# Patient Record
Sex: MALE | Race: BLACK OR AFRICAN AMERICAN | NOT HISPANIC OR LATINO | Employment: STUDENT | URBAN - METROPOLITAN AREA
[De-identification: names, ages, dates, MRNs, and addresses within clinical notes are randomized per-mention and may not be internally consistent; named-entity substitution may affect disease eponyms.]

---

## 2019-05-28 ENCOUNTER — HOSPITAL ENCOUNTER (EMERGENCY)
Facility: HOSPITAL | Age: 24
Discharge: HOME | End: 2019-05-28
Attending: EMERGENCY MEDICINE
Payer: MEDICAID

## 2019-05-28 VITALS
RESPIRATION RATE: 18 BRPM | HEART RATE: 90 BPM | SYSTOLIC BLOOD PRESSURE: 151 MMHG | DIASTOLIC BLOOD PRESSURE: 65 MMHG | OXYGEN SATURATION: 97 % | HEIGHT: 72 IN | BODY MASS INDEX: 41.99 KG/M2 | TEMPERATURE: 98 F | WEIGHT: 310 LBS

## 2019-05-28 DIAGNOSIS — R05.9 COUGH: ICD-10-CM

## 2019-05-28 DIAGNOSIS — J45.901 MILD ASTHMA WITH EXACERBATION, UNSPECIFIED WHETHER PERSISTENT: Primary | ICD-10-CM

## 2019-05-28 DIAGNOSIS — R06.02 SOB (SHORTNESS OF BREATH): ICD-10-CM

## 2019-05-28 LAB
ALBUMIN SERPL BCP-MCNC: 4.2 G/DL (ref 3.5–5.2)
ALP SERPL-CCNC: 86 U/L (ref 55–135)
ALT SERPL W/O P-5'-P-CCNC: 109 U/L (ref 10–44)
ANION GAP SERPL CALC-SCNC: 8 MMOL/L (ref 8–16)
AST SERPL-CCNC: 94 U/L (ref 10–40)
BASOPHILS # BLD AUTO: 0.01 K/UL (ref 0–0.2)
BASOPHILS NFR BLD: 0.2 % (ref 0–1.9)
BILIRUB SERPL-MCNC: 0.8 MG/DL (ref 0.1–1)
BUN SERPL-MCNC: 11 MG/DL (ref 6–20)
CALCIUM SERPL-MCNC: 9.9 MG/DL (ref 8.7–10.5)
CHLORIDE SERPL-SCNC: 105 MMOL/L (ref 95–110)
CO2 SERPL-SCNC: 25 MMOL/L (ref 23–29)
CREAT SERPL-MCNC: 0.9 MG/DL (ref 0.5–1.4)
DIFFERENTIAL METHOD: NORMAL
EOSINOPHIL # BLD AUTO: 0.1 K/UL (ref 0–0.5)
EOSINOPHIL NFR BLD: 1.1 % (ref 0–8)
ERYTHROCYTE [DISTWIDTH] IN BLOOD BY AUTOMATED COUNT: 12.8 % (ref 11.5–14.5)
EST. GFR  (AFRICAN AMERICAN): >60 ML/MIN/1.73 M^2
EST. GFR  (NON AFRICAN AMERICAN): >60 ML/MIN/1.73 M^2
GLUCOSE SERPL-MCNC: 83 MG/DL (ref 70–110)
HCT VFR BLD AUTO: 46.9 % (ref 40–54)
HGB BLD-MCNC: 15.1 G/DL (ref 14–18)
LIPASE SERPL-CCNC: 19 U/L (ref 4–60)
LYMPHOCYTES # BLD AUTO: 1.7 K/UL (ref 1–4.8)
LYMPHOCYTES NFR BLD: 25.5 % (ref 18–48)
MCH RBC QN AUTO: 27.2 PG (ref 27–31)
MCHC RBC AUTO-ENTMCNC: 32.2 G/DL (ref 32–36)
MCV RBC AUTO: 84 FL (ref 82–98)
MONOCYTES # BLD AUTO: 0.6 K/UL (ref 0.3–1)
MONOCYTES NFR BLD: 8.3 % (ref 4–15)
NEUTROPHILS # BLD AUTO: 4.3 K/UL (ref 1.8–7.7)
NEUTROPHILS NFR BLD: 64.9 % (ref 38–73)
PLATELET # BLD AUTO: 298 K/UL (ref 150–350)
PMV BLD AUTO: 10.5 FL (ref 9.2–12.9)
POTASSIUM SERPL-SCNC: 3.7 MMOL/L (ref 3.5–5.1)
PROT SERPL-MCNC: 7.5 G/DL (ref 6–8.4)
RBC # BLD AUTO: 5.56 M/UL (ref 4.6–6.2)
SODIUM SERPL-SCNC: 138 MMOL/L (ref 136–145)
TROPONIN I SERPL DL<=0.01 NG/ML-MCNC: <0.006 NG/ML (ref 0–0.03)
WBC # BLD AUTO: 6.66 K/UL (ref 3.9–12.7)

## 2019-05-28 PROCEDURE — 83690 ASSAY OF LIPASE: CPT

## 2019-05-28 PROCEDURE — 84484 ASSAY OF TROPONIN QUANT: CPT

## 2019-05-28 PROCEDURE — 96374 THER/PROPH/DIAG INJ IV PUSH: CPT

## 2019-05-28 PROCEDURE — 93005 ELECTROCARDIOGRAM TRACING: CPT

## 2019-05-28 PROCEDURE — 85025 COMPLETE CBC W/AUTO DIFF WBC: CPT

## 2019-05-28 PROCEDURE — 94640 AIRWAY INHALATION TREATMENT: CPT

## 2019-05-28 PROCEDURE — 25000242 PHARM REV CODE 250 ALT 637 W/ HCPCS: Performed by: PHYSICIAN ASSISTANT

## 2019-05-28 PROCEDURE — 93010 EKG 12-LEAD: ICD-10-PCS | Mod: ,,, | Performed by: INTERNAL MEDICINE

## 2019-05-28 PROCEDURE — 80053 COMPREHEN METABOLIC PANEL: CPT

## 2019-05-28 PROCEDURE — 99285 EMERGENCY DEPT VISIT HI MDM: CPT | Mod: 25

## 2019-05-28 PROCEDURE — 93010 ELECTROCARDIOGRAM REPORT: CPT | Mod: ,,, | Performed by: INTERNAL MEDICINE

## 2019-05-28 PROCEDURE — 63600175 PHARM REV CODE 636 W HCPCS: Performed by: PHYSICIAN ASSISTANT

## 2019-05-28 PROCEDURE — 25000003 PHARM REV CODE 250: Performed by: PHYSICIAN ASSISTANT

## 2019-05-28 RX ORDER — IPRATROPIUM BROMIDE AND ALBUTEROL SULFATE 2.5; .5 MG/3ML; MG/3ML
3 SOLUTION RESPIRATORY (INHALATION)
Status: COMPLETED | OUTPATIENT
Start: 2019-05-28 | End: 2019-05-28

## 2019-05-28 RX ORDER — ALBUTEROL SULFATE 90 UG/1
1-2 AEROSOL, METERED RESPIRATORY (INHALATION) EVERY 6 HOURS PRN
Qty: 1 INHALER | Refills: 0 | Status: SHIPPED | OUTPATIENT
Start: 2019-05-28 | End: 2020-05-27

## 2019-05-28 RX ORDER — METHYLPREDNISOLONE SOD SUCC 125 MG
125 VIAL (EA) INJECTION
Status: COMPLETED | OUTPATIENT
Start: 2019-05-28 | End: 2019-05-28

## 2019-05-28 RX ORDER — PREDNISONE 20 MG/1
40 TABLET ORAL DAILY
Qty: 10 TABLET | Refills: 0 | Status: SHIPPED | OUTPATIENT
Start: 2019-05-28 | End: 2019-06-02

## 2019-05-28 RX ADMIN — IPRATROPIUM BROMIDE AND ALBUTEROL SULFATE 3 ML: .5; 3 SOLUTION RESPIRATORY (INHALATION) at 01:05

## 2019-05-28 RX ADMIN — METHYLPREDNISOLONE SODIUM SUCCINATE 125 MG: 125 INJECTION, POWDER, FOR SOLUTION INTRAMUSCULAR; INTRAVENOUS at 01:05

## 2019-05-28 RX ADMIN — LIDOCAINE HYDROCHLORIDE: 20 SOLUTION ORAL; TOPICAL at 03:05

## 2019-05-28 NOTE — ED NOTES
Pt reports increased stress at home and states he has anxiety.  Reports he has financial stressors in addition to stressors from school.

## 2019-05-28 NOTE — ED TRIAGE NOTES
Pt reports sob since yesterday with chest pain ongoing for a year worse today.  Reports history of asthma.  Pt reports cough x1 week.  PT mouth breathing and speaking with whisper.  Stidor in throat audible heard with inhalations.

## 2019-05-28 NOTE — ED PROVIDER NOTES
"Encounter Date: 5/28/2019    SCRIBE #1 NOTE: I, Roshni Emmanuel, am scribing for, and in the presence of,  Tr Gomes. I have scribed the entire note.       History     Chief Complaint   Patient presents with    Shortness of Breath     Pt reports 2 day hx of wheezing. Pt has had cough for several days. Pt with audible wheeze/stridor at triage.      CC: Shortness of breath    HPI:  This is a 23 y.o. male with a PMHx of asthma, and PSHx of tonsillectomy who presents to the Emergency Department with a cc of shortness of breath since yesterday. Associated symptoms include wheezing, mild neck swelling, and mild trouble swallowing since yesterday, and cough x 1 week. Additionally, he reports intermittent chest pain over that past year. He denies fever or chills. He reports no worsening or alleviating factors. He states this is different from his prior asthma exacerbations because, "the chest pain is causing my symptoms." NKDA, but reports allergy to shellfish.        The history is provided by the patient.     Review of patient's allergies indicates:   Allergen Reactions    Shellfish containing products Hives     Past Medical History:   Diagnosis Date    Appendicitis     Asthma      Past Surgical History:   Procedure Laterality Date    APPENDECTOMY      DRAINAGE N/A 5/12/2015    Performed by [facility] Surgeon at [facility]    Teja placement Bilateral     for bowing    TONSILLECTOMY       No family history on file.  Social History     Tobacco Use    Smoking status: Never Smoker    Smokeless tobacco: Never Used   Substance Use Topics    Alcohol use: No    Drug use: Not on file     Review of Systems   Constitutional: Negative for chills and fever.   HENT: Positive for trouble swallowing. Negative for sore throat.    Respiratory: Positive for cough, shortness of breath and wheezing.    Cardiovascular: Positive for chest pain.   Gastrointestinal: Negative for nausea.   Genitourinary: Negative for dysuria. "   Musculoskeletal: Negative for back pain.        Positive for neck swelling.   Skin: Negative for rash.   Neurological: Negative for weakness.   Hematological: Does not bruise/bleed easily.       Physical Exam     Initial Vitals [05/28/19 1316]   BP Pulse Resp Temp SpO2   126/68 83 20 98 °F (36.7 °C) 97 %      MAP       --         Physical Exam    Nursing note and vitals reviewed.  Constitutional: He appears well-developed and well-nourished. No distress.   HENT:   Head: Normocephalic and atraumatic.   Nose: Nose normal.   Mouth/Throat: Oropharynx is clear and moist.   Eyes: EOM are normal. Pupils are equal, round, and reactive to light.   Neck: Normal range of motion. Neck supple.   Cardiovascular: Normal rate, regular rhythm and normal heart sounds. Exam reveals no gallop and no friction rub.    No murmur heard.  Pulmonary/Chest: Effort normal and breath sounds normal. No accessory muscle usage. No tachypnea. No respiratory distress. He has no wheezes. He has no rhonchi. He has no rales.   Patient appears to exhale forcibly to create a stridorous noise.  Unable to actively auscultate the lung fields secondary to upper airway noise transmission.  Patient does not appear tachypneic or have increased work of breathing.  Patient is able to speak in full clear sentences.   Abdominal: Soft. Bowel sounds are normal. He exhibits no mass. There is no tenderness. There is no rebound and no guarding.   Musculoskeletal: Normal range of motion.   Neurological: He is alert and oriented to person, place, and time. He has normal strength. No cranial nerve deficit or sensory deficit.   Skin: Skin is warm and dry. Capillary refill takes less than 2 seconds.   Psychiatric: He has a normal mood and affect.         ED Course   Procedures  Labs Reviewed   COMPREHENSIVE METABOLIC PANEL - Abnormal; Notable for the following components:       Result Value    AST 94 (*)      (*)     All other components within normal limits    TROPONIN I   CBC W/ AUTO DIFFERENTIAL   LIPASE          Imaging Results          X-Ray Chest 1 View (Final result)  Result time 05/28/19 15:13:58   Procedure changed from X-Ray Chest PA And Lateral     Final result by Kwame Stephens MD (05/28/19 15:13:58)                 Impression:      No acute cardiopulmonary process.      Electronically signed by: Kwame Stephens MD  Date:    05/28/2019  Time:    15:13             Narrative:    EXAMINATION:  XR CHEST 1 VIEW    CLINICAL HISTORY:  sob; Cough    TECHNIQUE:  Single frontal view of the chest was performed.    COMPARISON:  Chest 01/06/2018    FINDINGS:  Heart size within normal limits.  Mediastinum is unremarkable.    When compared to the previous study patient is slightly oblique greater to the right.  Slight prominence of the hilar markings on the left, likely vascular in nature.  Lungs are otherwise clear.  Pulmonary vasculature within normal limits.  There is no pleural effusions.                                 Medical Decision Making:   Differential Diagnosis:   Differential diagnosis includes but is not limited to:  Asthma exacerbation, pneumonia, pneumothorax, ACS, PE, angioedema, CHF, cardiac arrhythmia    Clinical Tests:   Lab Tests: Ordered and Reviewed  Radiological Study: Ordered and Reviewed  ED Management:  This is an evaluation of a 23 y.o. male who presents to the ED for shortness of breath.  Vital signs are stable.   Patient is not tachypneic and without evidence of hypoxia with room air oxygen 97%.  Patient is nontoxic appearing and in no acute distress.  There was difficulty auscultating the lung secondary to upper airway noise transmission.  Patient appears to forcibly inhaling creating a stridorous noise.  Patient does not appear tachypneic.  No accessory muscle usage or other signs of respiratory distress. Posterior oropharynx clear.  Uvula is midline. Patient is able to handle his secretions.  Patient treated the ED with duo nebs and  methylprednisolone.  Chest x-ray shows no acute cardiopulmonary processes.  EKG shows normal sinus rhythm with a rate of 78 with no ST segment changes.  Upon re-evaluation, patient was breathing comfortably on room air without stridorous noises.  As soon as I made my presence known to the patient, the patient began forcefully inhaling creating the same noise he did on initial presentation.  Given complaints of chest pain, labs are obtained. CBC showed no leukocytosis.  H&H stable. CMP shows no significant electrolyte abnormalities.  Lipase within normal limits. Troponin negative. I doubt cardiac etiology of patient's symptoms at this time.  Given history of asthma, I suspect the etiology patient's symptoms likely secondary to asthma exacerbation.  Will discharge patient home with albuterol inhaler and prednisone.  Will encourage patient follow up primary care.    Patient given return precautions and instructed to return to the emergency department for any new or worsening symptoms. Patient verbalized understanding and agreed with plan.     I discussed the case with Dr. Royal who is in agreement with my assessment and plan.                        Clinical Impression:     1. Mild asthma with exacerbation, unspecified whether persistent    2. Cough    3. SOB (shortness of breath)            Disposition:   Disposition: Discharged  Condition: Stable    Scribe attestation: I, Tr Gomes , personally performed the services described in this documentation. All medical record entries made by the scribe were at my direction and in my presence.  I have reviewed the chart and agree that the record reflects my personal performance and is accurate and complete.                      Tr Gomes PA-C  05/28/19 [id number]

## 2019-05-29 ENCOUNTER — HOSPITAL ENCOUNTER (EMERGENCY)
Facility: HOSPITAL | Age: 24
Discharge: HOME | End: 2019-05-29
Attending: EMERGENCY MEDICINE
Payer: MEDICAID

## 2019-05-29 VITALS
DIASTOLIC BLOOD PRESSURE: 63 MMHG | TEMPERATURE: 98 F | WEIGHT: 310 LBS | SYSTOLIC BLOOD PRESSURE: 133 MMHG | OXYGEN SATURATION: 99 % | HEART RATE: 112 BPM | HEIGHT: 72 IN | BODY MASS INDEX: 41.99 KG/M2 | RESPIRATION RATE: 19 BRPM

## 2019-05-29 DIAGNOSIS — F41.9 ANXIETY: Primary | ICD-10-CM

## 2019-05-29 DIAGNOSIS — R05.9 COUGH: ICD-10-CM

## 2019-05-29 DIAGNOSIS — R06.1 STRIDOR: ICD-10-CM

## 2019-05-29 DIAGNOSIS — J45.909 ASTHMA: ICD-10-CM

## 2019-05-29 PROCEDURE — 25000242 PHARM REV CODE 250 ALT 637 W/ HCPCS: Performed by: PHYSICIAN ASSISTANT

## 2019-05-29 PROCEDURE — 99285 EMERGENCY DEPT VISIT HI MDM: CPT | Mod: 25

## 2019-05-29 PROCEDURE — 94640 AIRWAY INHALATION TREATMENT: CPT

## 2019-05-29 PROCEDURE — 27000221 HC OXYGEN, UP TO 24 HOURS

## 2019-05-29 PROCEDURE — 99285 PR EMERGENCY DEPT VISIT,LEVEL V: ICD-10-PCS | Mod: ,,, | Performed by: EMERGENCY MEDICINE

## 2019-05-29 PROCEDURE — 94761 N-INVAS EAR/PLS OXIMETRY MLT: CPT

## 2019-05-29 PROCEDURE — 25000003 PHARM REV CODE 250: Performed by: PHYSICIAN ASSISTANT

## 2019-05-29 PROCEDURE — 99285 EMERGENCY DEPT VISIT HI MDM: CPT | Mod: ,,, | Performed by: EMERGENCY MEDICINE

## 2019-05-29 RX ORDER — LORAZEPAM 0.5 MG/1
0.5 TABLET ORAL
Status: COMPLETED | OUTPATIENT
Start: 2019-05-29 | End: 2019-05-29

## 2019-05-29 RX ORDER — GUAIFENESIN 600 MG/1
600 TABLET, EXTENDED RELEASE ORAL
Status: COMPLETED | OUTPATIENT
Start: 2019-05-29 | End: 2019-05-29

## 2019-05-29 RX ORDER — IPRATROPIUM BROMIDE AND ALBUTEROL SULFATE 2.5; .5 MG/3ML; MG/3ML
3 SOLUTION RESPIRATORY (INHALATION)
Status: COMPLETED | OUTPATIENT
Start: 2019-05-29 | End: 2019-05-29

## 2019-05-29 RX ADMIN — IPRATROPIUM BROMIDE AND ALBUTEROL SULFATE 3 ML: .5; 3 SOLUTION RESPIRATORY (INHALATION) at 03:05

## 2019-05-29 RX ADMIN — IPRATROPIUM BROMIDE AND ALBUTEROL SULFATE 3 ML: .5; 3 SOLUTION RESPIRATORY (INHALATION) at 02:05

## 2019-05-29 RX ADMIN — LORAZEPAM 0.5 MG: 0.5 TABLET ORAL at 03:05

## 2019-05-29 RX ADMIN — GUAIFENESIN 600 MG: 600 TABLET, EXTENDED RELEASE ORAL at 03:05

## 2019-05-29 NOTE — ED PROVIDER NOTES
Encounter Date: 5/29/2019       History     Chief Complaint   Patient presents with    Asthma     seen at  yest told asthma, pump not working     The patient is a 22 yo male with a PMHx significant for asthma and tonsillectomy. He presents to the ER c/o of an asthma attack. He reports having coughing, wheezes, throat tightness, and SOB intermittently x 1 week. He denies any fever or chills. He denies any mitigating or exacerbating factors. He denies any possibility of FB, aspiration, or choking. He went to an ER for this yesterday and had a negative work up and was improved after being treated with Prednisone and Albuterol, but the symptoms returned this afternoon per his family.          Review of patient's allergies indicates:   Allergen Reactions    Shellfish containing products Hives     Past Medical History:   Diagnosis Date    Appendicitis     Asthma      Past Surgical History:   Procedure Laterality Date    APPENDECTOMY      DRAINAGE N/A 5/12/2015    Performed by Latonya Surgeon at [facility]    Teja placement Bilateral     for bowing    TONSILLECTOMY       No family history on file.  Social History     Tobacco Use    Smoking status: Never Smoker    Smokeless tobacco: Never Used   Substance Use Topics    Alcohol use: No    Drug use: Not on file     Review of Systems   Constitutional: Negative for chills and fever.   HENT: Negative for dental problem, drooling, facial swelling, sore throat, trouble swallowing and voice change.    Eyes: Negative for visual disturbance.   Respiratory: Positive for cough, shortness of breath, wheezing and stridor.    Cardiovascular: Negative for chest pain, palpitations and leg swelling.   Gastrointestinal: Negative for abdominal pain, diarrhea, nausea and vomiting.   Genitourinary: Negative for decreased urine volume and hematuria.   Musculoskeletal: Negative for gait problem, neck pain and neck stiffness.   Skin: Negative for color change and rash.   Neurological:  Negative for dizziness, seizures, syncope, speech difficulty, weakness, light-headedness, numbness and headaches.   Psychiatric/Behavioral: Negative for confusion. The patient is nervous/anxious.        Physical Exam     Initial Vitals [05/29/19 1422]   BP Pulse Resp Temp SpO2   112/80 103 18 98.3 °F (36.8 °C) 99 %      MAP       --         Physical Exam    Nursing note and vitals reviewed.  Constitutional: He appears well-developed and well-nourished. He is not diaphoretic.   Alert and ambulatory. Accompanied by family.    HENT:   Head: Normocephalic.   Mouth/Throat: Oropharynx is clear and moist. No oropharyngeal exudate.   Patent airway. No drooling or trismus. No palatal petechia. No FB. No oropharyngeal abscess, edema, or exudate. No induration to oral floor.    Eyes: Conjunctivae are normal.   Neck: Normal range of motion. Neck supple. No tracheal deviation present.   Symmetrical. No mass or swelling. No JVD.    Cardiovascular: Normal rate and intact distal pulses.   Pulmonary/Chest: He has no wheezes. He has no rhonchi. He has no rales.   Occasional harsh cough. He is moving air well on auscultation with clear lung fields bilaterally. He has an audible stridor that appears to be intentionally made by him with forced exhales, which resolves and then reoccurs upon entry and exit of his room.    Abdominal: Soft. He exhibits no distension. There is no tenderness. There is no rebound.   Musculoskeletal: Normal range of motion. He exhibits no edema.   Neurological: He is alert and oriented to person, place, and time. He has normal strength. No sensory deficit.   Skin: Skin is warm and dry.   Psychiatric:   Anxious/dramatic behavior.          ED Course   Procedures  Labs Reviewed - No data to display       Imaging Results          X-Ray Chest PA And Lateral (Final result)  Result time 05/29/19 16:06:24    Final result by Jazmin Tyler MD (05/29/19 16:06:24)                 Impression:      No acute or active  disease.      Electronically signed by: Jazmin Tyler  Date:    05/29/2019  Time:    16:06             Narrative:    EXAMINATION:  XR CHEST PA AND LATERAL    CLINICAL HISTORY:  Unspecified asthma, uncomplicated    TECHNIQUE:  PA and lateral views of the chest were performed.    COMPARISON:  01/06/2018 chest film.  05/28/2019 chest film.    FINDINGS:  Frontal lateral views presented.  Small inspiratory exam.  There is slight streaky opacities at the bases probably hypoventilatory changes.  No interstitial edema or pneumothorax or pleural effusion or consolidation or nodule.  Hilar shadows and trachea appear normal.  Heart is normal size.  Visualized bowel gas pattern appears normal.  There are overlying leads.                               X-Ray Neck Soft Tissue (Final result)  Result time 05/29/19 16:08:59    Final result by Tushar Early MD (05/29/19 16:08:59)                 Impression:      1. No acute abnormalities within the soft tissues of the neck.      Electronically signed by: Tushar Early MD  Date:    05/29/2019  Time:    16:08             Narrative:    EXAMINATION:  XR NECK SOFT TISSUE    CLINICAL HISTORY:  Stridor    TECHNIQUE:  AP and lateral soft tissue views the neck were performed.    COMPARISON:  None.    FINDINGS:  Two views.    On lateral imaging, there is grossly adequate alignment of the cervical spine without significant vertebral body height loss or disc space height loss.  The facet joints are aligned.  The paraspinous and hypopharyngeal soft tissues are unremarkable.  AP spinal alignment is unremarkable.  The airway is patent on lateral and AP view.  The lung apices are clear.  The epiglottis is not thickened.  No significant tonsillar enlargement.                                 Medical Decision Making:   History:   Old Medical Records: I decided to obtain old medical records.  Initial Assessment:   Pt here c/o cough, wheezes, stridor, and SOB intermittently x 1 week.   Differential  Diagnosis:   Asthma, Stridor, Epiglottitis, Mau angina, FB, Pneumothorax, Anxiety, Panic attack, Aspiration, Choking, malingering/attention seeking, Laryngitis, Bronchospasm, etc   Clinical Tests:   Lab Tests: Reviewed  Radiological Study: Ordered and Reviewed  Medical Tests: Reviewed  ED Management:  I reviewed records - full work up done yesterday   I discussed the case in detail with the ER attending physician who also saw the patient   I discussed the case in detail with the respiratory therapist who states that the patient is not wheezing or restricted and that he suspects anxiety as etiology of forced stridor.   Repeat imaging of chest and additional neck films obtained - unremarkable   Pt improved after treatment in the ER - Duo-nebs, Ativan, and Mucinex provided. He is on steroids and Albuterol at home already.   Advised close follow up with PCP   Advised prompt return to the ER if unimproved or if worse in any way    Other:   I have discussed this case with another health care provider.    Additional MDM:   X-Rays: I have independently interpreted X-Ray(s) - see notes.                    Clinical Impression:       ICD-10-CM ICD-9-CM   1. Anxiety F41.9 300.00   2. Asthma J45.909 493.90   3. Stridor R06.1 786.1   4. Cough R05 786.2         Disposition:   Disposition: Discharged  Condition: Stable                        Kishor Palafox PA-C  05/29/19 1911

## 2019-05-29 NOTE — ED NOTES
Pt identifiers checked and accurate with Rupal Mata    Pt reports to ED with complaints of SOB and right sided flank and chest pain beginning last night. Pt reports pain and trouble breathing began yesterday, seen in ER, symptoms returned last night.    LOC: The patient is awake, alert and aware of environment with an appropriate affect, the patient is oriented x 3  APPEARANCE: Patient resting comfortably and in no acute distress, patient is clean and well groomed. Pt changed to hospital gown and placed on monitoring  SKIN: The skin is warm and dry, color consistent with ethnicity, patient has normal skin turgor and moist mucus membranes, skin intact.  MUSCULOSKELETAL: Patient moving all extremities well, no obvious swelling or deformities noted.   RESPIRATORY: Airway is open and patent; respirations are spontaneous, patient has increased rate. PT oxygen saturation 96 on room air, provided 2 L NC for comfort measures.   CARDIAC: Patient has no peripheral edema noted, capillary refill < 3 seconds. Pt reports chest pain at this time.    ABDOMEN: Soft and non tender to palpation, distention noted. Bowel sounds present x 4  NEUROLOGIC: Eyes open spontaneously, behavior appropriate to situation, follows commands, facial expression symmetrical, purposeful motor response noted    2 RNKishor PA and Respiratory at the bedside.

## 2019-09-29 ENCOUNTER — HOSPITAL ENCOUNTER (EMERGENCY)
Facility: HOSPITAL | Age: 24
Discharge: HOME | End: 2019-09-29
Attending: EMERGENCY MEDICINE
Payer: COMMERCIAL

## 2019-09-29 VITALS
SYSTOLIC BLOOD PRESSURE: 127 MMHG | TEMPERATURE: 98 F | BODY MASS INDEX: 41.99 KG/M2 | HEIGHT: 72 IN | RESPIRATION RATE: 14 BRPM | HEART RATE: 83 BPM | OXYGEN SATURATION: 100 % | WEIGHT: 310 LBS | DIASTOLIC BLOOD PRESSURE: 54 MMHG

## 2019-09-29 DIAGNOSIS — B34.9 VIRAL SYNDROME: Primary | ICD-10-CM

## 2019-09-29 LAB
ALBUMIN SERPL BCP-MCNC: 3.9 G/DL (ref 3.5–5.2)
ALP SERPL-CCNC: 88 U/L (ref 55–135)
ALT SERPL W/O P-5'-P-CCNC: 31 U/L (ref 10–44)
ANION GAP SERPL CALC-SCNC: 9 MMOL/L (ref 8–16)
AST SERPL-CCNC: 29 U/L (ref 10–40)
BASOPHILS # BLD AUTO: 0.02 K/UL (ref 0–0.2)
BASOPHILS NFR BLD: 0.4 % (ref 0–1.9)
BILIRUB SERPL-MCNC: 0.3 MG/DL (ref 0.1–1)
BUN SERPL-MCNC: 14 MG/DL (ref 6–20)
CALCIUM SERPL-MCNC: 9.6 MG/DL (ref 8.7–10.5)
CHLORIDE SERPL-SCNC: 105 MMOL/L (ref 95–110)
CO2 SERPL-SCNC: 26 MMOL/L (ref 23–29)
CREAT SERPL-MCNC: 1 MG/DL (ref 0.5–1.4)
DEPRECATED S PYO AG THROAT QL EIA: NEGATIVE
DIFFERENTIAL METHOD: ABNORMAL
EOSINOPHIL # BLD AUTO: 0.2 K/UL (ref 0–0.5)
EOSINOPHIL NFR BLD: 3 % (ref 0–8)
ERYTHROCYTE [DISTWIDTH] IN BLOOD BY AUTOMATED COUNT: 12.1 % (ref 11.5–14.5)
EST. GFR  (AFRICAN AMERICAN): >60 ML/MIN/1.73 M^2
EST. GFR  (NON AFRICAN AMERICAN): >60 ML/MIN/1.73 M^2
GLUCOSE SERPL-MCNC: 88 MG/DL (ref 70–110)
HCT VFR BLD AUTO: 43.4 % (ref 40–54)
HGB BLD-MCNC: 14 G/DL (ref 14–18)
IMM GRANULOCYTES # BLD AUTO: 0.03 K/UL (ref 0–0.04)
IMM GRANULOCYTES NFR BLD AUTO: 0.6 % (ref 0–0.5)
INFLUENZA A, MOLECULAR: NEGATIVE
INFLUENZA B, MOLECULAR: NEGATIVE
LYMPHOCYTES # BLD AUTO: 1.6 K/UL (ref 1–4.8)
LYMPHOCYTES NFR BLD: 29.5 % (ref 18–48)
MCH RBC QN AUTO: 26.9 PG (ref 27–31)
MCHC RBC AUTO-ENTMCNC: 32.3 G/DL (ref 32–36)
MCV RBC AUTO: 83 FL (ref 82–98)
MONOCYTES # BLD AUTO: 0.6 K/UL (ref 0.3–1)
MONOCYTES NFR BLD: 11.3 % (ref 4–15)
NEUTROPHILS # BLD AUTO: 2.9 K/UL (ref 1.8–7.7)
NEUTROPHILS NFR BLD: 55.2 % (ref 38–73)
NRBC BLD-RTO: 0 /100 WBC
PLATELET # BLD AUTO: 283 K/UL (ref 150–350)
PMV BLD AUTO: 10 FL (ref 9.2–12.9)
POTASSIUM SERPL-SCNC: 4.1 MMOL/L (ref 3.5–5.1)
PROT SERPL-MCNC: 7.2 G/DL (ref 6–8.4)
RBC # BLD AUTO: 5.21 M/UL (ref 4.6–6.2)
SODIUM SERPL-SCNC: 140 MMOL/L (ref 136–145)
SPECIMEN SOURCE: NORMAL
WBC # BLD AUTO: 5.32 K/UL (ref 3.9–12.7)

## 2019-09-29 PROCEDURE — 85025 COMPLETE CBC W/AUTO DIFF WBC: CPT

## 2019-09-29 PROCEDURE — 99285 PR EMERGENCY DEPT VISIT,LEVEL V: ICD-10-PCS | Mod: ,,, | Performed by: EMERGENCY MEDICINE

## 2019-09-29 PROCEDURE — 87081 CULTURE SCREEN ONLY: CPT

## 2019-09-29 PROCEDURE — 63600175 PHARM REV CODE 636 W HCPCS: Performed by: EMERGENCY MEDICINE

## 2019-09-29 PROCEDURE — 99284 EMERGENCY DEPT VISIT MOD MDM: CPT | Mod: 25

## 2019-09-29 PROCEDURE — 80053 COMPREHEN METABOLIC PANEL: CPT

## 2019-09-29 PROCEDURE — 99285 EMERGENCY DEPT VISIT HI MDM: CPT | Mod: ,,, | Performed by: EMERGENCY MEDICINE

## 2019-09-29 PROCEDURE — 87880 STREP A ASSAY W/OPTIC: CPT

## 2019-09-29 PROCEDURE — 87502 INFLUENZA DNA AMP PROBE: CPT

## 2019-09-29 RX ORDER — CLOTRIMAZOLE AND BETAMETHASONE DIPROPIONATE 10; .64 MG/G; MG/G
CREAM TOPICAL
COMMUNITY

## 2019-09-29 RX ORDER — CLOBETASOL PROPIONATE 0.46 MG/ML
SOLUTION TOPICAL
COMMUNITY

## 2019-09-29 RX ORDER — NYSTATIN 100000 [USP'U]/G
POWDER TOPICAL
COMMUNITY

## 2019-09-29 RX ORDER — KETOCONAZOLE 20 MG/ML
SHAMPOO, SUSPENSION TOPICAL
COMMUNITY

## 2019-09-29 RX ORDER — KETOCONAZOLE 20 MG/G
CREAM TOPICAL
COMMUNITY

## 2019-09-29 RX ORDER — HYDROCORTISONE 25 MG/G
CREAM TOPICAL
COMMUNITY

## 2019-09-29 RX ORDER — CETIRIZINE HYDROCHLORIDE 10 MG/1
TABLET ORAL
COMMUNITY

## 2019-09-29 RX ADMIN — SODIUM CHLORIDE 1000 ML: 0.9 INJECTION, SOLUTION INTRAVENOUS at 08:09

## 2019-09-30 NOTE — ED TRIAGE NOTES
Pt reports feeling ill since last Monday N/V/D x5 episodes,  Feverish but did not take temp, chills and body aches, also reports CP sharp pain 8/10 radiating to shoulders, took OTC tylenol extra strength with no relief, also reports nasal congestion , sore throat, weakness and dizziness.        LOC: The patient is awake, alert, and oriented to place, time, situation. Affect is appropriate.  Speech is appropriate and clear.     APPEARANCE: Patient resting comfortably in no acute distress.  Patient is clean and well groomed.    SKIN: The skin is warm and dry; color consistent with ethnicity.  Patient has normal skin turgor and moist mucus membranes.  Skin intact; no breakdown or bruising noted.     MUSCULOSKELETAL: Patient moving upper and lower extremities without difficulty.  Denies weakness.     RESPIRATORY: Airway is open and patent. Respirations spontaneous, even, easy, and non-labored.  Patient has a normal effort and rate.  No accessory muscle use noted. Denies cough.     CARDIAC:  Normal rhythm and rate noted.  No peripheral edema noted. No complaints of chest pain.      ABDOMEN: Soft and non tender to palpation.  No distention noted.     NEUROLOGIC: Eyes open spontaneously.  Behavior appropriate to situation.  Follows commands; facial expression symmetrical.  Purposeful motor response noted; normal sensation in all extremities.

## 2019-09-30 NOTE — ED PROVIDER NOTES
Encounter Date: 9/29/2019    SCRIBE #1 NOTE: I, Blanco Gunter, am scribing for, and in the presence of,  Dr. Wheatley. I have scribed the following portions of the note - Other sections scribed: HPI, ROS, MDM, PE.       History     Chief Complaint   Patient presents with    Multuple complaints     headache, sore throat, chills and body aches x 4 days     Patient is a 23 year old male presenting with multiple complaints. These include nausea, vomiting, chills coughing, chest pain, and a headache. The headache located towards the front of his head. The symptoms have lasted for four days. He believes he has the flu. Vomiting throughout today.     The history is provided by the patient.     Review of patient's allergies indicates:   Allergen Reactions    Shellfish containing products Hives    Shrimp Hives     Past Medical History:   Diagnosis Date    Appendicitis     Asthma      Past Surgical History:   Procedure Laterality Date    APPENDECTOMY      Teja placement Bilateral     for bowing    TONSILLECTOMY       History reviewed. No pertinent family history.  Social History     Tobacco Use    Smoking status: Never Smoker    Smokeless tobacco: Never Used   Substance Use Topics    Alcohol use: No    Drug use: Not on file     Review of Systems   Constitutional: Positive for chills. Negative for fever.   HENT: Positive for sore throat.    Eyes: Negative for pain and redness.   Respiratory: Positive for cough.    Cardiovascular: Positive for chest pain.   Gastrointestinal: Positive for nausea and vomiting.   Genitourinary: Negative for dysuria and hematuria.   Musculoskeletal: Negative for back pain and neck pain.   Neurological: Positive for headaches (front of head).   Psychiatric/Behavioral: Negative for behavioral problems and confusion.   All other systems reviewed and are negative.      Physical Exam     Initial Vitals [09/29/19 1936]   BP Pulse Resp Temp SpO2   123/64 94 16 98.3 °F (36.8 °C) 98 %      MAP        --         Physical Exam    Nursing note and vitals reviewed.  Constitutional: He appears well-developed and well-nourished. No distress.   HENT:   Head: Normocephalic and atraumatic.   Eyes: EOM are normal. Pupils are equal, round, and reactive to light.   Neck: Normal range of motion. Neck supple.   Cardiovascular: Normal rate, regular rhythm, normal heart sounds and intact distal pulses.   Pulmonary/Chest: Breath sounds normal. No respiratory distress.   Abdominal: Soft. Bowel sounds are normal. He exhibits no distension.   Musculoskeletal: Normal range of motion. He exhibits no edema.   Neurological: He is alert and oriented to person, place, and time. He has normal strength and normal reflexes. No cranial nerve deficit or sensory deficit. GCS score is 15. GCS eye subscore is 4. GCS verbal subscore is 5. GCS motor subscore is 6.   Skin: Skin is warm and dry. Capillary refill takes less than 2 seconds.         ED Course   Procedures  Labs Reviewed   CBC W/ AUTO DIFFERENTIAL - Abnormal; Notable for the following components:       Result Value    Mean Corpuscular Hemoglobin 26.9 (*)     Immature Granulocytes 0.6 (*)     All other components within normal limits   INFLUENZA A & B BY MOLECULAR   THROAT SCREEN, RAPID   CULTURE, STREP A,  THROAT   COMPREHENSIVE METABOLIC PANEL          Imaging Results          X-Ray Chest PA And Lateral (Final result)  Result time 09/29/19 21:34:02    Final result by Tunde Sneed MD (09/29/19 21:34:02)                 Impression:      No acute cardiopulmonary process.      Electronically signed by: Tunde Sneed MD  Date:    09/29/2019  Time:    21:34             Narrative:    EXAMINATION:  XR CHEST PA AND LATERAL    CLINICAL HISTORY:  cough;    TECHNIQUE:  PA and lateral views of the chest were performed.    COMPARISON:  May 29, 2019.    FINDINGS:  There is no consolidation, effusion, or pneumothorax.    Cardiomediastinal silhouette is unremarkable.    Regional osseous  structures are unremarkable.                                 Medical Decision Making:   History:   Old Medical Records: I decided to obtain old medical records.  Initial Assessment:   Patient with flu like symptoms and vomiting. Will get labs, IV fluids, and screen for flu and strep. Doubt meningitis.   Independently Interpreted Test(s):   I have ordered and independently interpreted X-rays - see prior notes.  Clinical Tests:   Lab Tests: Ordered and Reviewed  Radiological Study: Ordered and Reviewed            Scribe Attestation:   Scribe #1: I performed the above scribed service and the documentation accurately describes the services I performed. I attest to the accuracy of the note.               Clinical Impression:       ICD-10-CM ICD-9-CM   1. Viral syndrome B34.9 079.99         Disposition:   Disposition: Discharged  Condition: Stable                        Robin Wheatley MD  09/30/19 0954

## 2019-10-02 LAB — BACTERIA THROAT CULT: NORMAL

## 2020-05-15 ENCOUNTER — OFFICE VISIT (OUTPATIENT)
Dept: PSYCHIATRY | Facility: CLINIC | Age: 25
End: 2020-05-15
Payer: MEDICAID

## 2020-05-15 DIAGNOSIS — F41.1 GAD (GENERALIZED ANXIETY DISORDER): ICD-10-CM

## 2020-05-15 DIAGNOSIS — F90.0 ADHD, PREDOMINANTLY INATTENTIVE TYPE: ICD-10-CM

## 2020-05-15 DIAGNOSIS — F33.1 MDD (MAJOR DEPRESSIVE DISORDER), RECURRENT EPISODE, MODERATE: Primary | ICD-10-CM

## 2020-05-15 PROCEDURE — 90792 PR PSYCHIATRIC DIAGNOSTIC EVALUATION W/MEDICAL SERVICES: ICD-10-PCS | Mod: AF,HB,, | Performed by: PSYCHIATRY & NEUROLOGY

## 2020-05-15 PROCEDURE — 99999 PR PBB SHADOW E&M-EST. PATIENT-LVL I: CPT | Mod: PBBFAC,,, | Performed by: PSYCHIATRY & NEUROLOGY

## 2020-05-15 PROCEDURE — 99999 PR PBB SHADOW E&M-EST. PATIENT-LVL I: ICD-10-PCS | Mod: PBBFAC,,, | Performed by: PSYCHIATRY & NEUROLOGY

## 2020-05-15 PROCEDURE — 99211 OFF/OP EST MAY X REQ PHY/QHP: CPT | Mod: PBBFAC | Performed by: PSYCHIATRY & NEUROLOGY

## 2020-05-15 PROCEDURE — 90792 PSYCH DIAG EVAL W/MED SRVCS: CPT | Mod: AF,HB,, | Performed by: PSYCHIATRY & NEUROLOGY

## 2020-05-15 RX ORDER — MULTIVITAMIN
1 TABLET ORAL DAILY
COMMUNITY
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
## 2020-07-08 ENCOUNTER — HOSPITAL ENCOUNTER (EMERGENCY)
Facility: HOSPITAL | Age: 25
Discharge: HOME | End: 2020-07-08
Attending: EMERGENCY MEDICINE
Payer: COMMERCIAL

## 2020-07-08 VITALS
RESPIRATION RATE: 20 BRPM | SYSTOLIC BLOOD PRESSURE: 135 MMHG | HEIGHT: 72 IN | DIASTOLIC BLOOD PRESSURE: 87 MMHG | TEMPERATURE: 98 F | HEART RATE: 105 BPM | BODY MASS INDEX: 41.99 KG/M2 | WEIGHT: 310 LBS | OXYGEN SATURATION: 97 %

## 2020-07-08 DIAGNOSIS — K08.89 PAIN, DENTAL: Primary | ICD-10-CM

## 2020-07-08 DIAGNOSIS — Z98.890 HISTORY OF RECENT DENTAL PROCEDURE: ICD-10-CM

## 2020-07-08 PROCEDURE — 99284 PR EMERGENCY DEPT VISIT,LEVEL IV: ICD-10-PCS | Mod: ,,, | Performed by: NURSE PRACTITIONER

## 2020-07-08 PROCEDURE — 99284 EMERGENCY DEPT VISIT MOD MDM: CPT | Mod: ,,, | Performed by: NURSE PRACTITIONER

## 2020-07-08 PROCEDURE — 99284 EMERGENCY DEPT VISIT MOD MDM: CPT

## 2020-07-08 RX ORDER — TRAMADOL HYDROCHLORIDE 50 MG/1
50 TABLET ORAL EVERY 8 HOURS PRN
Qty: 15 TABLET | Refills: 0 | Status: SHIPPED | OUTPATIENT
Start: 2020-07-08 | End: 2023-12-14 | Stop reason: ALTCHOICE

## 2020-07-08 RX ORDER — AMOXICILLIN AND CLAVULANATE POTASSIUM 875; 125 MG/1; MG/1
1 TABLET, FILM COATED ORAL 2 TIMES DAILY
Qty: 14 TABLET | Refills: 0 | Status: SHIPPED | OUTPATIENT
Start: 2020-07-08 | End: 2024-02-18 | Stop reason: CLARIF

## 2020-07-08 RX ORDER — DEXTROAMPHETAMINE SACCHARATE, AMPHETAMINE ASPARTATE MONOHYDRATE, DEXTROAMPHETAMINE SULFATE AND AMPHETAMINE SULFATE 3.75; 3.75; 3.75; 3.75 MG/1; MG/1; MG/1; MG/1
25 CAPSULE, EXTENDED RELEASE ORAL EVERY MORNING
COMMUNITY

## 2020-07-08 RX ORDER — AMOXICILLIN AND CLAVULANATE POTASSIUM 875; 125 MG/1; MG/1
1 TABLET, FILM COATED ORAL 2 TIMES DAILY
Qty: 14 TABLET | Refills: 0 | Status: SHIPPED | OUTPATIENT
Start: 2020-07-08 | End: 2020-07-08 | Stop reason: SDUPTHER

## 2020-07-08 NOTE — ED TRIAGE NOTES
Patient states went to new dentist last Wednesday, states swelling and unable to chew food. No OTC meds today, excedrin yesterday. Has not called Dentist.

## 2020-07-08 NOTE — ED PROVIDER NOTES
Encounter Date: 7/8/2020       History     Chief Complaint   Patient presents with    Dental Pain     had fillings, now causing headaches     This is the urgent evaluation a 24-year-old black male who reports had a dental procedure last Monday and has been having some facial swelling with dental pain and headache since then.  He has not followed back up with the dentist.  He denies any fever/chills, difficulty breathing or swallowing.        Review of patient's allergies indicates:   Allergen Reactions    Shellfish containing products Hives    Shrimp Hives     Past Medical History:   Diagnosis Date    ADHD (attention deficit hyperactivity disorder)     Anxiety     Appendicitis     Asthma     Depression      Past Surgical History:   Procedure Laterality Date    APPENDECTOMY      Teja placement Bilateral     for bowing    TONSILLECTOMY       Family History   Problem Relation Age of Onset    Depression Mother     ADD / ADHD Brother     Asperger's syndrome Brother     Bipolar disorder Maternal Grandmother      Social History     Tobacco Use    Smoking status: Never Smoker    Smokeless tobacco: Never Used   Substance Use Topics    Alcohol use: No    Drug use: Never     Review of Systems   Constitutional: Negative for chills and fever.   HENT: Positive for dental problem and facial swelling (has resolved). Negative for congestion.    Respiratory: Negative for cough and shortness of breath.    Cardiovascular: Negative for chest pain.   Neurological: Positive for headaches.   All other systems reviewed and are negative.      Physical Exam     Initial Vitals [07/08/20 1544]   BP Pulse Resp Temp SpO2   135/87 105 20 98 °F (36.7 °C) 97 %      MAP       --         Physical Exam    Nursing note and vitals reviewed.  Constitutional: Vital signs are normal. He appears well-developed and well-nourished. No distress.   HENT:   Head: Normocephalic and atraumatic.   Nose: Nose normal.   Mouth/Throat: Uvula is  midline, oropharynx is clear and moist and mucous membranes are normal. No trismus in the jaw. No uvula swelling or dental caries.       No facial swelling noted    Bilateral upper dental pain with gingival tenderness   Neck: Neck supple. No spinous process tenderness and no muscular tenderness present. Normal range of motion present. No neck rigidity.   Cardiovascular: Normal rate, regular rhythm and normal heart sounds.   Pulmonary/Chest: Effort normal and breath sounds normal. He has no decreased breath sounds. He has no wheezes.   Neurological: He is alert and oriented to person, place, and time. GCS eye subscore is 4. GCS verbal subscore is 5. GCS motor subscore is 6.   Skin: Skin is warm, dry and intact.         ED Course   Procedures  Labs Reviewed - No data to display       Imaging Results    None          Medical Decision Making:   Differential Diagnosis:   Differential Diagnosis includes, but is not limited to:  Mau's angina, parotitis, gingival abscess, facial cellulitis, peritonsillar/retropharyngeal abscess, periapical abscess, dental fracture, dental caries    ED Management:  After complete evaluation, including thorough history and physical exam, the patient's symptoms are most consistent with benign dentalgia due to pain from procedure. Physical exam is benign, without significant facial, tongue, or neck swelling to suggest cellulitis, abscess, or Mau's angina. The patient is tolerating secretions without drooling, dysphagia, or voice changes to suggest deep space neck infection. There is no intraoral or gingival fluctuance to suggest abscess requiring incision/drainage at this time. However, due to inability to completely rule out pulpitis or periapical abscess, plan to cover with PO augmentin.   Patient was instructed to follow-up with a dentist as soon as possible for further evaluation and tooth extraction if needed.                                   Clinical Impression:       ICD-10-CM  ICD-9-CM   1. Pain, dental  K08.89 525.9   2. History of recent dental procedure  Z98.890 V15.29         Disposition:   Disposition: Discharged  Condition: Stable     ED Disposition Condition    Discharge Stable        ED Prescriptions     Medication Sig Dispense Start Date End Date Auth. Provider    amoxicillin-clavulanate 875-125mg (AUGMENTIN) 875-125 mg per tablet Take 1 tablet by mouth 2 (two) times daily. 14 tablet 7/8/2020  LASHANDA Yu    traMADoL (ULTRAM) 50 mg tablet Take 1 tablet (50 mg total) by mouth every 8 (eight) hours as needed for Pain. 15 tablet 7/8/2020  LASHANDA Yu        Follow-up Information     Follow up With Specialties Details Why Contact Info    [facility] Schedule an appointment as soon as possible for a visit   [address] [place] [id number]  [phone]                                       LASHANDA Yu  07/08/20 2230

## 2020-07-08 NOTE — ED NOTES
Patient identifiers verified and correct for Mr Mata  C/C: dental pian, pain to entire lower face SEE NN  APPEARANCE: awake and alert in NAD.  SKIN: warm, dry and intact. No breakdown or bruising.  MUSCULOSKELETAL: Patient moving all extremities spontaneously, no obvious swelling or deformities noted. Ambulates independently.  RESPIRATORY: Denies shortness of breath.Respirations unlabored. Denies fevers  CARDIAC: Denies CP, 2+ distal pulses; no peripheral edema  ABDOMEN: S/ND/NT, Denies nausea  : voids spontaneously, denies difficulty  Neurologic: AAO x 4; follows commands equal strength in all extremities; denies numbness/tingling. Denies dizziness Denies weakness, states unable to chew food due to pain

## 2021-04-16 ENCOUNTER — PATIENT MESSAGE (OUTPATIENT)
Dept: RESEARCH | Facility: HOSPITAL | Age: 26
End: 2021-04-16

## 2022-07-02 ENCOUNTER — HOSPITAL ENCOUNTER (EMERGENCY)
Facility: HOSPITAL | Age: 27
Discharge: HOME | End: 2022-07-02
Attending: EMERGENCY MEDICINE
Payer: MEDICAID

## 2022-07-02 VITALS
DIASTOLIC BLOOD PRESSURE: 82 MMHG | WEIGHT: 315 LBS | TEMPERATURE: 99 F | OXYGEN SATURATION: 98 % | HEIGHT: 72 IN | RESPIRATION RATE: 18 BRPM | BODY MASS INDEX: 42.66 KG/M2 | SYSTOLIC BLOOD PRESSURE: 123 MMHG | HEART RATE: 98 BPM

## 2022-07-02 DIAGNOSIS — U07.1 COVID-19 VIRUS DETECTED: ICD-10-CM

## 2022-07-02 DIAGNOSIS — U07.1 COVID-19: Primary | ICD-10-CM

## 2022-07-02 LAB
CTP QC/QA: YES
GROUP A STREP, MOLECULAR: NEGATIVE
SARS-COV-2 RDRP RESP QL NAA+PROBE: POSITIVE

## 2022-07-02 PROCEDURE — 99284 PR EMERGENCY DEPT VISIT,LEVEL IV: ICD-10-PCS | Mod: CR,,, | Performed by: EMERGENCY MEDICINE

## 2022-07-02 PROCEDURE — 99283 EMERGENCY DEPT VISIT LOW MDM: CPT

## 2022-07-02 PROCEDURE — 25000003 PHARM REV CODE 250: Performed by: PHYSICIAN ASSISTANT

## 2022-07-02 PROCEDURE — 99284 EMERGENCY DEPT VISIT MOD MDM: CPT | Mod: CR,,, | Performed by: EMERGENCY MEDICINE

## 2022-07-02 PROCEDURE — U0002 COVID-19 LAB TEST NON-CDC: HCPCS | Performed by: PHYSICIAN ASSISTANT

## 2022-07-02 PROCEDURE — 87651 STREP A DNA AMP PROBE: CPT | Performed by: PHYSICIAN ASSISTANT

## 2022-07-02 RX ORDER — ACETAMINOPHEN 325 MG/1
650 TABLET ORAL
Status: COMPLETED | OUTPATIENT
Start: 2022-07-02 | End: 2022-07-02

## 2022-07-02 RX ADMIN — ACETAMINOPHEN 650 MG: 325 TABLET ORAL at 05:07

## 2022-07-02 NOTE — FIRST PROVIDER EVALUATION
Medical screening exam completed.  I have conducted a focused provider triage encounter, findings are as follows:    Brief history of present illness:  Sore throat, hx of strep pharyngitis. Chills.    Vitals:    07/02/22 1618   BP: (!) 159/73   Pulse: (!) 111   Resp: 18   Temp: 99 °F (37.2 °C)   TempSrc: Oral   SpO2: 97%   Weight: (!) 154.2 kg (340 lb)   Height: 6' (1.829 m)       Pertinent physical exam:  Phonation normal. Handling secretions. Unable to visualize tonsils without tongue depressor.     Evaluation will be continued and followed by the physician or advanced practice provider that is assigned to the patient when roomed.

## 2022-07-02 NOTE — ED PROVIDER NOTES
Encounter Date: 7/2/2022       History     Chief Complaint   Patient presents with    Sore Throat     Hx strep     26-year-old male with history of asthma, anxiety, ADHD depression presents to ED for 2 days of subjective fever, sore throat and cough.  Denies any shortness of breath, chest pain or dizziness.  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  Is up-to-date on his COVID-19 vaccines.        Review of patient's allergies indicates:   Allergen Reactions    Shellfish containing products Hives    Shrimp Hives     Past Medical History:   Diagnosis Date    ADHD (attention deficit hyperactivity disorder)     Anxiety     Appendicitis     Asthma     Depression      Past Surgical History:   Procedure Laterality Date    APPENDECTOMY      Teja placement Bilateral     for bowing    TONSILLECTOMY       Family History   Problem Relation Age of Onset    Depression Mother     ADD / ADHD Brother     Asperger's syndrome Brother     Bipolar disorder Maternal Grandmother      Social History     Tobacco Use    Smoking status: Never Smoker    Smokeless tobacco: Never Used   Substance Use Topics    Alcohol use: No    Drug use: Never     Review of Systems   Constitutional: Negative for chills and fever.   HENT: Positive for sore throat.    Eyes: Negative for pain.   Respiratory: Positive for cough. Negative for shortness of breath.    Cardiovascular: Negative for chest pain.   Gastrointestinal: Negative for abdominal pain, nausea and vomiting.   Genitourinary: Negative for difficulty urinating and dysuria.   Musculoskeletal: Positive for myalgias.   Skin: Negative.    Neurological: Negative for weakness.   Psychiatric/Behavioral: Negative for confusion.       Physical Exam     Initial Vitals [07/02/22 1618]   BP Pulse Resp Temp SpO2   (!) 159/73 (!) 111 18 99 °F (37.2 °C) 97 %      MAP       --         Physical Exam    Nursing note and vitals reviewed.  Constitutional: He appears  well-developed and well-nourished. He is not diaphoretic. No distress.   HENT:   Head: Normocephalic and atraumatic.   Mouth/Throat: No oropharyngeal exudate.   Eyes: Conjunctivae are normal. Pupils are equal, round, and reactive to light.   Neck: Neck supple.   Normal range of motion.  Cardiovascular: Normal rate, regular rhythm, normal heart sounds and intact distal pulses. Exam reveals no gallop.    No murmur heard.  Pulmonary/Chest: Breath sounds normal.   Abdominal: Abdomen is soft. Bowel sounds are normal. There is no abdominal tenderness.   Musculoskeletal:         General: Normal range of motion.      Cervical back: Normal range of motion and neck supple.     Neurological: He is alert and oriented to person, place, and time. GCS score is 15. GCS eye subscore is 4. GCS verbal subscore is 5. GCS motor subscore is 6.   Skin: Skin is warm and dry. Capillary refill takes less than 2 seconds.   Psychiatric: He has a normal mood and affect.         ED Course   Procedures  Labs Reviewed   SARS-COV-2 RDRP GENE - Abnormal; Notable for the following components:       Result Value    POC Rapid COVID Positive (*)     All other components within normal limits    Narrative:     This test utilizes isothermal nucleic acid amplification   technology to detect the SARS-CoV-2 RdRp nucleic acid segment.   The analytical sensitivity (limit of detection) is 125 genome   equivalents/mL.   A POSITIVE result implies infection with the SARS-CoV-2 virus;   the patient is presumed to be contagious.     A NEGATIVE result means that SARS-CoV-2 nucleic acids are not   present above the limit of detection. A NEGATIVE result should be   treated as presumptive. It does not rule out the possibility of   COVID-19 and should not be the sole basis for treatment decisions.   If COVID-19 is strongly suspected based on clinical and exposure   history, re-testing using an alternate molecular assay should be   considered.   This test is only for use  under the Food and Drug   Administration s Emergency Use Authorization (EUA).   Commercial kits are provided by [employer].   Performance characteristics of the EUA have been independently   verified by [facility] Department of   Pathology and Laboratory Medicine.   _________________________________________________________________   The authorized Fact Sheet for Healthcare Providers and the authorized Fact   Sheet for Patients of the ID NOW COVID-19 are available on the FDA   website:     https://www.fda.gov/media/453925/download  https://www.fda.gov/media/811152/download           GROUP A STREP, MOLECULAR   HIV 1 / 2 ANTIBODY   HEPATITIS C ANTIBODY          Imaging Results    None          Medications   acetaminophen tablet 650 mg (650 mg Oral Given 7/2/22 1738)     Medical Decision Making:   History:   Old Medical Records: I decided to obtain old medical records.  Clinical Tests:   Lab Tests: Ordered and Reviewed       APC / Resident Notes:   26-year-old male presents to the ED for flu-like symptoms.  Reports sore throat, cough, subjective fever and chills.    On exam patient is in no acute distress, nontoxic appearing with reassuring vital signs.  Lungs clear to auscultation bilaterally.  Mild erythema to the oropharynx and no exudate.  Speaking full sentences and tolerating secretions with O2 saturation of 98% on room air.    Differential includes but not limited to COVID-19, flu, strep pharyngitis, viral syndrome    Patient is COVID-19 positive in the ED, strep negative.  Will discharge with quarantine as well as supportive care instructions.  Discussed return precautions for any new or worsening symptoms.  Patient verbalized agreement understanding plan.       Attending Attestation:     Physician Attestation Statement for NP/PA:   I discussed this assessment and plan of this patient with the NP/PA, but I did not personally examine the patient. The face to face encounter was performed by the  NP/PA.                       Clinical Impression:   Final diagnoses:  [U07.1] COVID-19 (Primary)          ED Disposition Condition    Discharge Stable        ED Prescriptions     None        Follow-up Information    None          Mary Ricks PA-C  07/02/22 1735       Sia Hernandez Jr., MD  07/03/22 2966

## 2022-07-02 NOTE — ED TRIAGE NOTES
Pt presents to the ED c/o multiple complaints. +sore throat, subjective fever, diarrhea, cough x 4 days.

## 2022-07-02 NOTE — DISCHARGE INSTRUCTIONS
Monitor your symptoms.  Take over-the-counter medication for symptoms such as decongestants, cough suppressants, sore throat medication, etc.  Take acetaminophen/Tylenol 650 mg every 6 hr for pain relief for fever reduction.  You can take ibuprofen 600 mg every 6 hr for additional relief.  Rest.  Stay hydrated.  Continue to social isolate.  Quarantine for 5 days after the 1st day of symptom onset or 72 hr after your last fever without fever reducing medication. You will need to wear you mask for an additional 5 days after that.  If you developed shortness of breath, please  a pulse oximeter that is over-the-counter.  Return to the ER or follow up if you oxygen saturations of 93% or below on room air.   Return to the ED for any concerning signs or symptoms.

## 2023-04-11 ENCOUNTER — PATIENT MESSAGE (OUTPATIENT)
Dept: RESEARCH | Facility: HOSPITAL | Age: 28
End: 2023-04-11
Payer: MEDICAID

## 2023-12-14 ENCOUNTER — HOSPITAL ENCOUNTER (EMERGENCY)
Facility: HOSPITAL | Age: 28
Discharge: HOME | End: 2023-12-14
Attending: EMERGENCY MEDICINE
Payer: COMMERCIAL

## 2023-12-14 ENCOUNTER — NURSE TRIAGE (OUTPATIENT)
Dept: ADMINISTRATIVE | Facility: CLINIC | Age: 28
End: 2023-12-14
Payer: COMMERCIAL

## 2023-12-14 VITALS
SYSTOLIC BLOOD PRESSURE: 123 MMHG | HEART RATE: 87 BPM | TEMPERATURE: 99 F | DIASTOLIC BLOOD PRESSURE: 69 MMHG | HEIGHT: 72 IN | WEIGHT: 315 LBS | RESPIRATION RATE: 18 BRPM | OXYGEN SATURATION: 98 % | BODY MASS INDEX: 42.66 KG/M2

## 2023-12-14 DIAGNOSIS — K04.7 DENTAL INFECTION: Primary | ICD-10-CM

## 2023-12-14 LAB
BUN SERPL-MCNC: 14 MG/DL (ref 6–30)
CHLORIDE SERPL-SCNC: 102 MMOL/L (ref 95–110)
CREAT SERPL-MCNC: 0.9 MG/DL (ref 0.5–1.4)
GLUCOSE SERPL-MCNC: 88 MG/DL (ref 70–110)
HCT VFR BLD CALC: 44 %PCV (ref 36–54)
POC IONIZED CALCIUM: 1.19 MMOL/L (ref 1.06–1.42)
POC TCO2 (MEASURED): 31 MMOL/L (ref 23–29)
POTASSIUM BLD-SCNC: 5.3 MMOL/L (ref 3.5–5.1)
SAMPLE: ABNORMAL
SODIUM BLD-SCNC: 139 MMOL/L (ref 136–145)

## 2023-12-14 PROCEDURE — 25500020 PHARM REV CODE 255: Performed by: EMERGENCY MEDICINE

## 2023-12-14 PROCEDURE — 80048 BASIC METABOLIC PNL TOTAL CA: CPT

## 2023-12-14 PROCEDURE — 99285 EMERGENCY DEPT VISIT HI MDM: CPT | Mod: 25

## 2023-12-14 RX ORDER — HYDROCODONE BITARTRATE AND ACETAMINOPHEN 5; 325 MG/1; MG/1
1 TABLET ORAL EVERY 6 HOURS PRN
Qty: 9 TABLET | Refills: 0 | Status: SHIPPED | OUTPATIENT
Start: 2023-12-14

## 2023-12-14 RX ORDER — AMOXICILLIN AND CLAVULANATE POTASSIUM 875; 125 MG/1; MG/1
1 TABLET, FILM COATED ORAL 2 TIMES DAILY
Qty: 14 TABLET | Refills: 0 | Status: SHIPPED | OUTPATIENT
Start: 2023-12-14 | End: 2024-02-18 | Stop reason: CLARIF

## 2023-12-14 RX ADMIN — IOHEXOL 75 ML: 350 INJECTION, SOLUTION INTRAVENOUS at 08:12

## 2023-12-14 NOTE — ED TRIAGE NOTES
Pt presents with c/o sore throat  with swelling on left side, intermittent discomfort with swallowing x 1 week, and toothache on left side. No relief with tylenol 1,000 mg. Pain 8/10 at this time. Pt states started off with tooth ache first x1 month. Pt denies nausea or vomiting.

## 2023-12-14 NOTE — TELEPHONE ENCOUNTER
Patient was seen in the ED for a tooth abscess. States that he was verbally told that a prescription for tramadol would be electronically prescribed. Patient states that there is not prescription at his pharmacy. Requesting the prescription to be sent to:    [facility]   [address] [id number]         Reason for Disposition   Nursing judgment    Protocols used: No Protocol [id number]

## 2023-12-14 NOTE — DISCHARGE INSTRUCTIONS
Ct finding   In light of the history, there is a cavity involving the most posterior left mandibular molar with a tiny periapical lucency extending towards the lingual cortex     I will start you on antibiotics for a dental infection however please follow up with a dentist as soon as possible.  You can also take Tylenol for pain.

## 2023-12-14 NOTE — ED PROVIDER NOTES
Encounter Date: 12/14/2023       History     Chief Complaint   Patient presents with    Sore Throat     States sore throat and throat swelling (on left side) x 1 week     28-year-old male medical history of anxiety, asthma presents emergency department due to left-sided throat discomfort along with concern of possible dental infection.  Patient reports symptoms have been ongoing for a week in his now developing a left-sided headache.  He also endorses pain with swallowing.  He denies fever chills.  Last saw a dentist a year ago      Review of patient's allergies indicates:   Allergen Reactions    Shellfish containing products Hives    Shrimp Hives     Past Medical History:   Diagnosis Date    ADHD (attention deficit hyperactivity disorder)     Anxiety     Appendicitis     Asthma     Depression      Past Surgical History:   Procedure Laterality Date    APPENDECTOMY      Teja placement Bilateral     for bowing    TONSILLECTOMY       Family History   Problem Relation Age of Onset    Depression Mother     ADD / ADHD Brother     Asperger's syndrome Brother     Bipolar disorder Maternal Grandmother      Social History     Tobacco Use    Smoking status: Never    Smokeless tobacco: Never   Substance Use Topics    Alcohol use: No    Drug use: Never     Review of Systems  See HPI   Physical Exam     Initial Vitals [12/14/23 0713]   BP Pulse Resp Temp SpO2   (!) 154/70 94 16 98.7 °F (37.1 °C) 97 %      MAP       --         Physical Exam    Vitals reviewed.  Constitutional: He appears well-developed and well-nourished.   HENT:   Head: Normocephalic and atraumatic.   Oropharynx moist and clear without exudates  There is abnormal dentition of the 3rd molar which appears to be cracked, no fluctuance along gumline  There is TTP along the left mandibular process without any significant facial swelling  Voices normal tone   Eyes: EOM are normal.   Neck: No thyromegaly present.   Normal range of motion.  Cardiovascular:  Normal rate.            Pulmonary/Chest: No respiratory distress.   Abdominal: He exhibits no distension.   Musculoskeletal:         General: Normal range of motion.      Cervical back: Normal range of motion.     Neurological: He is alert and oriented to person, place, and time.   Skin: Skin is warm and dry.         ED Course   Procedures  Labs Reviewed   ISTAT PROCEDURE - Abnormal; Notable for the following components:       Result Value    POC Potassium 5.3 (*)     POC TCO2 (MEASURED) 31 (*)     All other components within normal limits   ISTAT CHEM8          Imaging Results              CT Soft Tissue Neck With Contrast (Final result)  Result time 12/14/23 09:13:28      Final result by Coy Aguila MD (12/14/23 09:13:28)                   Impression:      In light of the history, there is a cavity involving the most posterior left mandibular molar with a tiny periapical lucency extending towards the lingual cortex.  Mild gingival edema extends to the floor of mouth without subperiosteal abscess.  Fullness along the posterior wall of the hypopharynx presumably reflects secretions unless clinical suspicious for pharyngeal edema however this is quite remote from the lesion.    Presumed reactive adenopathy on the left.      Electronically signed by: Coy Aguila  Date:    12/14/2023  Time:    09:13               Narrative:    EXAMINATION:  CT SOFT TISSUE NECK WITH CONTRAST    CLINICAL HISTORY:  Neck abscess, deep tissue;left side throat discomfort;    TECHNIQUE:  Low dose axial images as well as sagittal and coronal reconstructions were performed from the skull base to the clavicles following the intravenous administration of 75 mL of Omnipaque 350.    COMPARISON:  None    FINDINGS:  In light of the history, there is a cavity identified involving the 2nd/most posterior left mandibular molar with a small periapical lucency extending to the lingual cortex.  No subperiosteal abscess is identified however mild edema along the  gingiva/floor of mouth is suspected.  No osseous destruction or periosteal reaction.    No parapharyngeal space or retropharyngeal edema or swelling of the epiglottis is identified.  Fullness along the posterior wall of hypopharynx thought more likely represent secretions.    Prominent left jugular chain lymph nodes presumably reactive in nature.    The paranasal sinuses and mastoid air cells are clear.  The no salivary gland calculi are identified.    The major vascular structures are patent.                                       Medications   iohexoL (OMNIPAQUE 350) injection 75 mL (75 mLs Intravenous Given 12/14/23 0855)     Medical Decision Making  28-year-old male presents to emergency department with concern of dental infection in left discomfort.  No obvious swelling on exam    Differential diagnosis includes attempts infection, dental I will also rule out PTA  CT negative for abscess, based on physical exam and image findings patient pain is secondary to dental infection.  You will begin a course of antibiotics and I referred him to dentist for possible tooth extraction.  Patient verbalized understanding with plan discussed seeing dentist as soon as possible provided resources.    Amount and/or Complexity of Data Reviewed  Radiology: ordered.    Risk  Prescription drug management.                                      Clinical Impression:  Final diagnoses:  [K04.7] Dental infection (Primary)          ED Disposition Condition    Discharge Stable          ED Prescriptions       Medication Sig Dispense Start Date End Date Auth. Provider    amoxicillin-clavulanate 875-125mg (AUGMENTIN) 875-125 mg per tablet Take 1 tablet by mouth 2 (two) times daily. 14 tablet 12/14/2023 -- Almaz Bobo PA-C          Follow-up Information    None          Almaz Bobo PA-C  12/14/23 1123

## 2023-12-14 NOTE — ED NOTES
I-STAT Chem-8+ Results:   Value Reference Range   Sodium 139 136-145 mmol/L   Potassium  5.3 3.5-5.1 mmol/L   Chloride 102  mmol/L   Ionized Calcium 1.19 1.06-1.42 mmol/L   CO2 (measured) 31 23-29 mmol/L   Glucose 88  mg/dL   BUN 14 6-30 mg/dL   Creatinine 0.9 0.5-1.4 mg/dL   Hematocrit 44 36-54%

## 2024-02-18 ENCOUNTER — HOSPITAL ENCOUNTER (EMERGENCY)
Facility: HOSPITAL | Age: 29
Discharge: HOME | End: 2024-02-18
Attending: STUDENT IN AN ORGANIZED HEALTH CARE EDUCATION/TRAINING PROGRAM
Payer: COMMERCIAL

## 2024-02-18 VITALS
TEMPERATURE: 98 F | OXYGEN SATURATION: 98 % | WEIGHT: 315 LBS | HEART RATE: 105 BPM | SYSTOLIC BLOOD PRESSURE: 132 MMHG | HEIGHT: 72 IN | BODY MASS INDEX: 42.66 KG/M2 | RESPIRATION RATE: 16 BRPM | DIASTOLIC BLOOD PRESSURE: 82 MMHG

## 2024-02-18 DIAGNOSIS — H61.20 IMPACTED CERUMEN, UNSPECIFIED LATERALITY: Primary | ICD-10-CM

## 2024-02-18 PROCEDURE — 99284 EMERGENCY DEPT VISIT MOD MDM: CPT | Mod: 25

## 2024-02-18 PROCEDURE — 69210 REMOVE IMPACTED EAR WAX UNI: CPT | Mod: 50

## 2024-02-18 RX ORDER — NEOMYCIN SULFATE, POLYMYXIN B SULFATE AND HYDROCORTISONE 10; 3.5; 1 MG/ML; MG/ML; [USP'U]/ML
3 SUSPENSION/ DROPS AURICULAR (OTIC) EVERY 8 HOURS
Qty: 10 ML | Refills: 0 | Status: SHIPPED | OUTPATIENT
Start: 2024-02-18 | End: 2024-03-07

## 2024-02-18 RX ORDER — AMOXICILLIN AND CLAVULANATE POTASSIUM 875; 125 MG/1; MG/1
1 TABLET, FILM COATED ORAL EVERY 12 HOURS
Qty: 14 TABLET | Refills: 0 | Status: SHIPPED | OUTPATIENT
Start: 2024-02-18 | End: 2024-02-26

## 2024-02-18 NOTE — ED NOTES
Patient identifiers verified and correct for Rupal Mata  LOC: The patient is awake, alert and aware of environment with an appropriate affect, the patient is oriented x 3 and speaking appropriately.   APPEARANCE: Patient appears comfortable and in no acute distress, patient is clean and well groomed.  SKIN: The skin is warm and dry, color consistent with ethnicity, patient has normal skin turgor and moist mucus membranes, skin intact, no breakdown or bruising noted.   RESPIRATORY: Airway is open and patent, respirations are spontaneous, patient has a normal effort and rate, no accessory muscle.

## 2024-02-18 NOTE — DISCHARGE INSTRUCTIONS
You had a cerumen impaction on your left removed in the ED. Your auditory canal and tympanic membrane is red.  Will treat you for an infection (outer and inner ear infection).    Apply Cortisporin drops every 8 hours for the next 5 days.  Take Augmentin every 12 hours for the next 7 days.  Take ibuprofen 600-800 mg every 6 hours as needed with food for anti-inflammatory relief.  You can take acetaminophen/tylenol 650 mg every 6 hours or 1000 mg every 8 hours for added relief.  Apply ice to the area for 10-20 minutes every 4 hours. You can apply heat 2 days after for the same duration and frequency.  Follow up with ENT if your symptoms do not improve.  Return to the ER for new or worsening symptoms.  No future appointments.

## 2024-02-18 NOTE — ED PROVIDER NOTES
Encounter Date: 2/18/2024       History     Chief Complaint   Patient presents with    Otalgia     Sore throat     8:30 AM  Patient is a 28-year-old male with a history anxiety, asthma, depression, ADHD who presents to [facility] ED via [facility]OV for emergent evaluation of left ear pain.    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  He states for 1 month he has had ear discomfort on the left with decreased hearing.  3 days ago he started to have stabbing pain.  He also notes a sore throat for 1 week and yellow rhinorrhea for 2 weeks.  Has a intermittent dry cough.  No fever or myalgias.  He denies Q-tip use.        Review of patient's allergies indicates:   Allergen Reactions    Shellfish containing products Hives    Shrimp Hives     Past Medical History:   Diagnosis Date    ADHD (attention deficit hyperactivity disorder)     Anxiety     Appendicitis     Asthma     Depression      Past Surgical History:   Procedure Laterality Date    APPENDECTOMY      Teja placement Bilateral     for bowing    TONSILLECTOMY       Family History   Problem Relation Age of Onset    Depression Mother     ADD / ADHD Brother     Asperger's syndrome Brother     Bipolar disorder Maternal Grandmother      Social History     Tobacco Use    Smoking status: Never    Smokeless tobacco: Never   Substance Use Topics    Alcohol use: No    Drug use: Never     Review of Systems   Constitutional:  Negative for activity change, appetite change and fever.   HENT:  Positive for ear pain, rhinorrhea and sore throat.    Respiratory:  Negative for shortness of breath.    Cardiovascular:  Negative for chest pain.   Gastrointestinal:  Negative for abdominal pain and nausea.   Genitourinary:  Negative for dysuria.   Musculoskeletal:  Negative for back pain.   Skin:  Negative for rash.   Neurological:  Negative for weakness.   Hematological:  Does not bruise/bleed easily.       Physical Exam     Initial Vitals [02/18/24 0753]   BP Pulse Resp Temp SpO2   132/82 105  16 98 °F (36.7 °C) 98 %      MAP       --         Physical Exam    Vitals reviewed.  Constitutional: He appears well-developed and well-nourished. He is not diaphoretic. He is Obese . He is cooperative.  Non-toxic appearance. He does not have a sickly appearance. He does not appear ill. No distress.   HENT:   Head: Normocephalic and atraumatic.   Right Ear: Hearing, tympanic membrane, external ear and ear canal normal.   Left Ear: External ear and ear canal normal. Tympanic membrane is injected. Tympanic membrane is not scarred, not perforated, not erythematous, not retracted and not bulging.   Nose: Nose normal.   Mouth/Throat: Uvula is midline and oropharynx is clear and moist. No trismus in the jaw. No oropharyngeal exudate, posterior oropharyngeal edema, posterior oropharyngeal erythema or tonsillar abscesses.   Hard cerumen impaction on the L. after impaction was removed, see procedure note, his auditory canal was erythematous without exudate.  TM injected without perforation, bulging, hemotympanum.   Eyes: Conjunctivae and EOM are normal.   Neck:   Normal range of motion.  Pulmonary/Chest: No accessory muscle usage. No tachypnea. No respiratory distress.   Abdominal: He exhibits no distension.   Musculoskeletal:         General: Normal range of motion.      Cervical back: Normal range of motion.     Neurological: He is alert. He has normal strength.   Skin: Skin is dry. No pallor.         ED Course   Ear Wax Removal    Date/Time: 2/18/2024 10:40 AM    Performed by: Anna Rodriguez PA-C  Authorized by: Jody Krueger MD    Anesthesia:  Local Anesthetic: none  Medication Used: Other (Diluted hydrogen peroxide).  Location details: left ear  Procedure type: curette (irrigation with 300 cc total and curette)  Cerumen Removal Results: Cerumen partially removed.  Patient tolerance: Patient tolerated the procedure well with no immediate complications  Comments: Most of patient's cerumen impaction was removed  successfully.         Labs Reviewed - No data to display       Imaging Results    None          Medications - No data to display  Medical Decision Making  Patient is a 28-year-old male with a history anxiety, asthma, depression, ADHD who presents to [facility] ED via POV for emergent evaluation of left ear pain.    Includes but is not limited to cerumen impaction, otitis media and externa less likely given exam, viral syndrome, viral sinusitis.  Posterior oropharynx clear without signs of strep pharyngitis or obstruction.  Vitals are stable.  Doubt sepsis.    Patient's left ear discomfort for 1 month and then sharp pain 3 days ago likely due to cerumen impaction maybe with underlying infection.  We will try to move remove so I could visualize his TM.    Risk  Prescription drug management.               ED Course as of 02/18/24 2242   Sun Feb 18, 2024   0819 BP: 132/82 [CL]   0819 Temp: 98 °F (36.7 °C) [CL]   0819 Pulse: 105 [CL]   0819 Resp: 16 [CL]   0819 SpO2: 98 % [CL]      ED Course User Index  [CL] Anna Rodriguez PA-C          Most of cerumen impaction removed successfully.  His auditory canal underneath was erythematous but no exudate.  His TM also injected without signs of perforation, retraction, bulging, hemotympanum.  I will cover him for otitis media and externa.  Will send to pharmacy of choice. Recommend follow up with ENT if symptoms do not improve. Return to ER precautions given.                  Clinical Impression:  Final diagnoses:  [H61.20] Impacted cerumen, unspecified laterality - LEFT (Primary)          ED Disposition Condition    Discharge Stable          ED Prescriptions       Medication Sig Dispense Start Date End Date Auth. Provider    neomycin-polymyxin-hydrocortisone (CORTISPORIN) 3.5-10,000-1 mg/mL-unit/mL-% otic suspension Place 3 drops into the left ear every 8 (eight) hours. for 5 days 10 mL 2/18/2024 2/23/2024 Anna Rodriguez PA-C    amoxicillin-clavulanate 875-125mg (AUGMENTIN) 875-125 mg  per tablet Take 1 tablet by mouth every 12 (twelve) hours. for 7 days 14 tablet 2/18/2024 2/25/2024 Anna Rodriguez PA-C          Follow-up Information       Follow up With Specialties Details Why Contact Info    [facility] Schedule an appointment as soon as possible for a visit   [address] [address]  [phone]               Anna Rodriguez PA-C  02/18/24 2468

## 2024-02-18 NOTE — ED TRIAGE NOTES
"Pt c/o left ear pain x 3 days.  Pain is "stabbing".  States has had some decreased hearing.  Also c/o sore throat and swelling.   Denies fever.  "

## 2024-07-10 ENCOUNTER — OFFICE VISIT (OUTPATIENT)
Dept: INTERNAL MEDICINE | Facility: CLINIC | Age: 29
End: 2024-07-10
Payer: COMMERCIAL

## 2024-07-10 ENCOUNTER — PATIENT MESSAGE (OUTPATIENT)
Dept: INTERNAL MEDICINE | Facility: CLINIC | Age: 29
End: 2024-07-10

## 2024-07-10 DIAGNOSIS — E66.9 OBESITY, UNSPECIFIED CLASSIFICATION, UNSPECIFIED OBESITY TYPE, UNSPECIFIED WHETHER SERIOUS COMORBIDITY PRESENT: Primary | ICD-10-CM

## 2024-07-10 PROCEDURE — 99499 UNLISTED E&M SERVICE: CPT | Mod: 95,,,

## 2024-07-10 RX ORDER — SEMAGLUTIDE 0.25 MG/.5ML
0.25 INJECTION, SOLUTION SUBCUTANEOUS
Qty: 2 ML | Refills: 0 | Status: ACTIVE | OUTPATIENT
Start: 2024-07-10

## 2024-07-10 RX ORDER — SEMAGLUTIDE 0.5 MG/.5ML
0.5 INJECTION, SOLUTION SUBCUTANEOUS
Qty: 2 ML | Refills: 0 | Status: ACTIVE | OUTPATIENT
Start: 2024-08-10

## 2024-07-10 RX ORDER — SEMAGLUTIDE 1 MG/.5ML
1 INJECTION, SOLUTION SUBCUTANEOUS
Qty: 2 ML | Refills: 0 | Status: ACTIVE | OUTPATIENT
Start: 2024-09-10

## 2024-07-10 NOTE — PATIENT INSTRUCTIONS
WEGOVY® (wee-ROSMERY-vee), (semaglutide) injection, for subcutaneous use  Read this Medication Guide and Instructions for Use before you start using WEGOVY and each time you get a refill. There may be new information. This information does not take the place of talking to your healthcare provider about your medical condition or your treatment.  What is the most important information I should know about WEGOVY?   WEGOVY may cause serious side effects, including:   Possible thyroid tumors, including cancer. Tell your healthcare provider if you get a lump or swelling in your neck, hoarseness, trouble swallowing, or shortness of breath. These may be symptoms of thyroid cancer. In studies with rodents, WEGOVY and medicines that work like WEGOVY caused thyroid tumors, including thyroid cancer. It is not known if WEGOVY will cause thyroid tumors or a type of thyroid cancer called medullary thyroid carcinoma (MTC) in people.   Do not use WEGOVY if you or any of your family have ever had a type of thyroid cancer called medullary thyroid carcinoma (MTC), or if you have an endocrine system condition called Multiple Endocrine Neoplasia syndrome type 2 (MEN 2).  What is WEGOVY?   WEGOVY is an injectable prescription medicine used with a reduced calorie diet and increased physical activity:   to reduce the risk of major cardiovascular events such as death, heart attack, or stroke in adults with known heart disease and with either obesity or overweight.   that may help adults and children aged 12 years and older with obesity, or some adults with overweight who also have weight-related medical problems, to help them lose excess body weight and keep the weight off.   WEGOVY contains semaglutide and should not be used with other semaglutide-containing products or other GLP-1 receptor agonist medicines.  It is not known if WEGOVY is safe and effective for use in children under 12 years of age.  Do not use WEGOVY if:   you or any of your  family have ever had a type of thyroid cancer called medullary thyroid carcinoma (MTC) or if you have an endocrine system condition called Multiple Endocrine Neoplasia syndrome type 2 (MEN 2).   you have had a serious allergic reaction to semaglutide or any of the ingredients in WEGOVY. See the end of this Medication Guide for a complete list of ingredients in WEGOVY. Symptoms of a serious allergic reaction include:   swelling of your face, lips, tongue or throat   fainting or feeling dizzy   problems breathing or swallowing   very rapid heartbeat   severe rash or itching  Before using WEGOVY, tell your healthcare provider if you have any other medical conditions, including if you:   have or have had problems with your pancreas or kidneys.   have type 2 diabetes and a history of diabetic retinopathy.   have or have had depression or suicidal thoughts, or mental health issues.   are pregnant or plan to become pregnant. WEGOVY may harm your unborn baby. You should stop using WEGOVY 2 months before you plan to become pregnant.   Pregnancy Exposure Registry: There is a pregnancy exposure registry for women who use WEGOVY during pregnancy. The purpose of this registry is to collect information about the health of you and your baby. Talk to your healthcare provider about how you can take part in this registry or you may contact [employer] at 1-997.663.3389.   are breastfeeding or plan to breastfeed. It is not known if WEGOVY passes into your breast milk. You should talk with your healthcare provider about the best way to feed your baby while using WEGOVY.   Tell your healthcare provider about all the medicines you take, including prescription and over-the-counter medicines, vitamins, and herbal supplements. WEGOVY may affect the way some medicines work and some medicines may affect the way WEGOVY works. Tell your healthcare provider if you are taking other medicines to treat diabetes, including sulfonylureas or insulin.  WEGOVY slows stomach emptying and can affect medicines that need to pass through the stomach quickly.   Know the medicines you take. Keep a list of them to show your healthcare provider and pharmacist when you get a new medicine.  How should I use WEGOVY?   Read the Instructions for Use that comes with WEGOVY.   Use WEGOVY exactly as your healthcare provider tells you to.   Your healthcare provider should show you how to use WEGOVY before you use it for the first time.   WEGOVY is injected under the skin (subcutaneously) of your stomach (abdomen), thigh, or upper arm. Do not inject WEGOVY into a muscle (intramuscularly) or vein (intravenously).   Change (rotate) your injection site with each injection. Do not use the same site for each injection.   Use WEGOVY 1 time each week, on the same day each week, at any time of the day.   Start WEGOVY with 0.25 mg per week in your first month. In your second month, increase your weekly dose to 0.5 mg. In the third month, increase your weekly dose to 1 mg. In the fourth month, increase your weekly dose to 1.7 mg. In the fifth month onwards, your healthcare provider may either maintain your dose at 1.7 mg weekly or increase your weekly dose to 2.4 mg.   If you need to change the day of the week, you may do so as long as your last dose of WEGOVY was given 2 or more days before.   If you miss a dose of WEGOVY and the next scheduled dose is more than 2 days away (48 hours), take the missed dose as soon as possible. If you miss a dose of WEGOVY and the next schedule dose is less than 2 days away (48 hours), do not administer the dose. Take your next dose on the regularly scheduled day.   If you miss doses of WEGOVY for more than 2 weeks, take your next dose on the regularly scheduled day or call your healthcare provider to talk about how to restart your treatment.   You can take WEGOVY with or without food.   If you take too much WEGOVY, you may have severe nausea, severe vomiting  and severe low blood sugar. Call your healthcare provider or go to the nearest hospital emergency room right away if you experience any of these symptoms.  What are the possible side effects of WEGOVY?   WEGOVY may cause serious side effects, including:   See What is the most important information I should know about WEGOVY?   inflammation of your pancreas (pancreatitis). Stop using WEGOVY and call your healthcare provider right away if you have severe pain in your stomach area (abdomen) that will not go away, with or without vomiting. You may feel the pain from your abdomen to your back.   gallbladder problems. WEGOVY may cause gallbladder problems including gallstones. Some gallbladder problems need surgery. Call your healthcare provider if you have any of the following symptoms:   pain in your upper stomach (abdomen)   yellowing of skin or eyes (jaundice)   fever   vanesa-colored stools   increased risk of low blood sugar (hypoglycemia), especially those who also take medicines to treat diabetes mellitus such as insulin or sulfonylureas. Low blood sugar in patients with diabetes who receive WEGOVY can be a serious side effect. Talk to your healthcare provider about how to recognize and treat low blood sugar. You should check your blood sugar before you start taking WEGOVY and while you take WEGOVY. Signs and symptoms of low blood sugar may include:   dizziness or light-headedness   sweating   shakiness   blurred vision   slurred speech   weakness   anxiety   hunger   headache   irritability or mood changes   confusion or drowsiness   fast heartbeat   feeling jittery  kidney problems (kidney failure). In people who have kidney problems, diarrhea, nausea, and vomiting may cause a loss of fluids (dehydration) which may cause kidney problems to get worse. It is important for you to drink fluids to help reduce your chance of dehydration.   serious allergic reactions. Stop using WEGOVY and get medical help right away,  if you have any symptoms of a serious allergic reaction including:   swelling of your face, lips, tongue   severe rash or itching o very rapid heartbeat or throat   problems breathing or swallowing fainting or feeling dizzy   change in vision in people with type 2 diabetes. Tell your healthcare provider if you have changes in vision during treatment with WEGOVY.   increased heart rate. WEGOVY can increase your heart rate while you are at rest. Your healthcare provider should check your heart rate while you take WEGOVY. Tell your healthcare provider if you feel your heart racing or pounding in your chest and it lasts for several minutes.   depression or thoughts of suicide. You should pay attention to any mental changes, especially sudden changes in your mood, behaviors, thoughts, or feelings. Call your healthcare provider right away if you have any mental changes that are new, worse, or worry you.   The most common side effects of WEGOVY in adults or children aged 12 years and older may include:  nausea   stomach (abdomen) pain   dizziness   gas   diarrhea   headache   feeling bloated   stomach flu   vomiting   tiredness (fatigue)   belching   heartburn   constipation   upset stomach   low blood sugar in people   runny nose or sore throat with type 2 diabetes   Talk to your healthcare provider about any side effect that bothers you or does not go away. These are not all the possible side effects of WEGOVY. Call your doctor for medical advice about side effects. You may report side effects to FDA at 5-903-FDA-4091.  General information about the safe and effective use of WEGOVY.  Medicines are sometimes prescribed for purposes other than those listed in a Medication Guide. Do not use WEGOVY for a condition for which it was not prescribed. Do not give WEGOVY to other people, even if they have the same symptoms that you have. It may harm them. You can ask your pharmacist or healthcare provider for information about  WEGOVY that is written for health professionals.  What are the ingredients in WEGOVY?   Active Ingredient: semaglutide   Inactive Ingredients: disodium phosphate dihydrate, 1.42 mg; sodium chloride, 8.25 mg; water for injection; and hydrochloric acid or sodium hydroxide may be added to adjust pH.  For more information, go to [email or web address] or call 3-313-Vpaoyo-8.

## 2024-07-10 NOTE — PROGRESS NOTES
Patient ID: Rupal Mata is a 28 y.o. Black or  male    Subjective  Chief Complaint: patient presents for medical weight loss management.    Contraindications to GLP-1 receptor agonist therapy:   Denies personal or family history of MTC, personal history of MEN2, history of allergic reaction while taking a GLP-1 receptor agonist, and history of pancreatitis while taking a GLP-1 receptor agonist     Co-morbidities: none      Weight loss history:  Starting weight:    6/3/2024   Recent Readings    Weight (lbs) 385 lb    BMI 52.21 BMI          Objective  Lab Results   Component Value Date     09/29/2019     05/28/2019     01/06/2018     Lab Results   Component Value Date    K 4.1 09/29/2019    K 3.7 05/28/2019    K 3.7 01/06/2018     Lab Results   Component Value Date     09/29/2019     05/28/2019     01/06/2018     Lab Results   Component Value Date    CO2 26 09/29/2019    CO2 25 05/28/2019    CO2 28 01/06/2018     Lab Results   Component Value Date    BUN 14 09/29/2019    BUN 11 05/28/2019    BUN 15 01/06/2018     Lab Results   Component Value Date    GLU 88 09/29/2019    GLU 83 05/28/2019     01/06/2018     Lab Results   Component Value Date    CALCIUM 9.6 09/29/2019    CALCIUM 9.9 05/28/2019    CALCIUM 9.6 01/06/2018     Lab Results   Component Value Date    PROT 7.2 09/29/2019    PROT 7.5 05/28/2019    PROT 7.2 01/06/2018     Lab Results   Component Value Date    ALBUMIN 3.9 09/29/2019    ALBUMIN 4.2 05/28/2019    ALBUMIN 4.2 01/06/2018     Lab Results   Component Value Date    BILITOT 0.3 09/29/2019    BILITOT 0.8 05/28/2019    BILITOT 0.4 01/06/2018     Lab Results   Component Value Date    AST 29 09/29/2019    AST 94 (H) 05/28/2019    AST 25 01/06/2018     Lab Results   Component Value Date    ALT 31 09/29/2019     (H) 05/28/2019    ALT 32 01/06/2018     Lab Results   Component Value Date    ANIONGAP 9 09/29/2019    ANIONGAP 8 05/28/2019     "ANIONGAP 11 01/06/2018     Lab Results   Component Value Date    CREATININE 1.0 09/29/2019    CREATININE 0.9 05/28/2019    CREATININE 0.96 01/06/2018     No results found for: "EGFRNORACEVR"      Assessment/Plan  -Pt qualifies for GLP-1 RA therapy based on BMI greater than or equal to 30 kg/m2  -Initiate Wegovy 0.25 mg once weekly for 1 month  -Then increase to Wegovy 0.5 mg once weekly for 1 month  -Then increase to Wegovy 1 mg once weekly  -RTC in 3 months       Patient consented to pharmacist management via collaborative practice.    "

## 2024-08-27 PROBLEM — E66.9 OBESITY, UNSPECIFIED: Status: ACTIVE | Noted: 2024-08-27

## 2024-10-10 ENCOUNTER — OFFICE VISIT (OUTPATIENT)
Dept: INTERNAL MEDICINE | Facility: CLINIC | Age: 29
End: 2024-10-10
Payer: COMMERCIAL

## 2024-10-10 ENCOUNTER — PATIENT MESSAGE (OUTPATIENT)
Dept: INTERNAL MEDICINE | Facility: CLINIC | Age: 29
End: 2024-10-10

## 2024-10-10 DIAGNOSIS — E66.9 OBESITY, UNSPECIFIED CLASS, UNSPECIFIED OBESITY TYPE, UNSPECIFIED WHETHER SERIOUS COMORBIDITY PRESENT: Primary | ICD-10-CM

## 2024-10-10 RX ORDER — SEMAGLUTIDE 2.4 MG/.75ML
2.4 INJECTION, SOLUTION SUBCUTANEOUS
Qty: 3 ML | Refills: 1 | Status: ACTIVE | OUTPATIENT
Start: 2024-10-10

## 2024-10-10 RX ORDER — SEMAGLUTIDE 1.7 MG/.75ML
1.7 INJECTION, SOLUTION SUBCUTANEOUS
Qty: 3 ML | Refills: 0 | Status: ACTIVE | OUTPATIENT
Start: 2024-10-10

## 2024-10-10 NOTE — PROGRESS NOTES
Patient ID: Rupal Mata is a 29 y.o. Black or  male    Subjective  Chief Complaint: patient presents for medical weight loss management.    Co-morbidities: none    HPI: Patient [facility] July 2024 and is currently managed on Wegovy 1 mg. Pt has taken 2 doses of this strength. Pt instructed to start recording weight regularly in digital medicine trisha.     Tolerance to current therapy:  Denies nausea, vomiting, diarrhea, constipation, abdominal pain    Weight loss history:  Starting weight:    6/3/2024   Recent Readings    Weight (lbs) 385 lb    BMI 52.21 BMI    Current weight: 365 lbs - pt reported   8/19/2024   Recent Readings    Weight (lbs) 385.1 lb    BMI 52.22 BMI    % weight loss since GLP-1 initiation: 5.2 %    Objective  Lab Results   Component Value Date     09/29/2019     05/28/2019     01/06/2018     Lab Results   Component Value Date    K 4.1 09/29/2019    K 3.7 05/28/2019    K 3.7 01/06/2018     Lab Results   Component Value Date     09/29/2019     05/28/2019     01/06/2018     Lab Results   Component Value Date    CO2 26 09/29/2019    CO2 25 05/28/2019    CO2 28 01/06/2018     Lab Results   Component Value Date    BUN 14 09/29/2019    BUN 11 05/28/2019    BUN 15 01/06/2018     Lab Results   Component Value Date    GLU 88 09/29/2019    GLU 83 05/28/2019     01/06/2018     Lab Results   Component Value Date    CALCIUM 9.6 09/29/2019    CALCIUM 9.9 05/28/2019    CALCIUM 9.6 01/06/2018     Lab Results   Component Value Date    PROT 7.2 09/29/2019    PROT 7.5 05/28/2019    PROT 7.2 01/06/2018     Lab Results   Component Value Date    ALBUMIN 3.9 09/29/2019    ALBUMIN 4.2 05/28/2019    ALBUMIN 4.2 01/06/2018     Lab Results   Component Value Date    BILITOT 0.3 09/29/2019    BILITOT 0.8 05/28/2019    BILITOT 0.4 01/06/2018     Lab Results   Component Value Date    AST 29 09/29/2019    AST 94 (H) 05/28/2019     "AST 25 01/06/2018     Lab Results   Component Value Date    ALT 31 09/29/2019     (H) 05/28/2019    ALT 32 01/06/2018     Lab Results   Component Value Date    ANIONGAP 9 09/29/2019    ANIONGAP 8 05/28/2019    ANIONGAP 11 01/06/2018     Lab Results   Component Value Date    CREATININE 1.0 09/29/2019    CREATININE 0.9 05/28/2019    CREATININE 0.96 01/06/2018     No results found for: "EGFRNORACEVR"    Assessment/Plan  - Increase to Wegovy 1.7 mg x 4 weeks once completed with Wegovy 1 mg  - Then increase to Wegovy 2.4 mg SQ weekly  - RTC in 3 months for follow-up evaluation    Patient consented to pharmacist management via collaborative practice.  "

## 2024-10-28 ENCOUNTER — OFFICE VISIT (OUTPATIENT)
Dept: URGENT CARE | Facility: CLINIC | Age: 29
End: 2024-10-28
Payer: COMMERCIAL

## 2024-10-28 VITALS
WEIGHT: 315 LBS | HEIGHT: 72 IN | HEART RATE: 64 BPM | BODY MASS INDEX: 42.66 KG/M2 | TEMPERATURE: 99 F | OXYGEN SATURATION: 99 % | RESPIRATION RATE: 20 BRPM | DIASTOLIC BLOOD PRESSURE: 88 MMHG | SYSTOLIC BLOOD PRESSURE: 142 MMHG

## 2024-10-28 DIAGNOSIS — L73.9 FOLLICULITIS: ICD-10-CM

## 2024-10-28 DIAGNOSIS — Z11.3 SCREENING EXAMINATION FOR STD (SEXUALLY TRANSMITTED DISEASE): Primary | ICD-10-CM

## 2024-10-28 LAB
BILIRUBIN, UA POC OHS: NEGATIVE
BLOOD, UA POC OHS: NEGATIVE
CLARITY, UA POC OHS: CLEAR
COLOR, UA POC OHS: ABNORMAL
GLUCOSE, UA POC OHS: NEGATIVE
KETONES, UA POC OHS: NEGATIVE
LEUKOCYTES, UA POC OHS: NEGATIVE
NITRITE, UA POC OHS: NEGATIVE
PH, UA POC OHS: 5.5
PROTEIN, UA POC OHS: NEGATIVE
SPECIFIC GRAVITY, UA POC OHS: >=1.03
UROBILINOGEN, UA POC OHS: 0.2

## 2024-10-28 PROCEDURE — 80074 ACUTE HEPATITIS PANEL: CPT

## 2024-10-28 PROCEDURE — 87389 HIV-1 AG W/HIV-1&-2 AB AG IA: CPT

## 2024-10-28 PROCEDURE — 81003 URINALYSIS AUTO W/O SCOPE: CPT | Mod: QW,S$GLB,,

## 2024-10-28 PROCEDURE — 87591 N.GONORRHOEAE DNA AMP PROB: CPT

## 2024-10-28 PROCEDURE — 87491 CHLMYD TRACH DNA AMP PROBE: CPT

## 2024-10-28 PROCEDURE — 86593 SYPHILIS TEST NON-TREP QUANT: CPT

## 2024-10-28 PROCEDURE — 99213 OFFICE O/P EST LOW 20 MIN: CPT | Mod: S$GLB,,,

## 2024-10-28 PROCEDURE — 87661 TRICHOMONAS VAGINALIS AMPLIF: CPT

## 2024-10-28 RX ORDER — MUPIROCIN 20 MG/G
OINTMENT TOPICAL DAILY
Qty: 22 G | Refills: 0 | Status: SHIPPED | OUTPATIENT
Start: 2024-10-28

## 2024-10-29 LAB
HAV IGM SERPL QL IA: NORMAL
HBV CORE IGM SERPL QL IA: NORMAL
HBV SURFACE AG SERPL QL IA: NORMAL
HCV AB SERPL QL IA: NORMAL
HIV 1+2 AB+HIV1 P24 AG SERPL QL IA: NORMAL
TREPONEMA PALLIDUM IGG+IGM AB [PRESENCE] IN SERUM OR PLASMA BY IMMUNOASSAY: NONREACTIVE

## 2024-10-30 ENCOUNTER — PATIENT MESSAGE (OUTPATIENT)
Dept: RESEARCH | Facility: HOSPITAL | Age: 29
End: 2024-10-30
Payer: COMMERCIAL

## 2024-10-30 LAB
SPECIMEN SOURCE: NORMAL
T VAGINALIS RRNA SPEC QL NAA+PROBE: NEGATIVE

## 2024-10-31 LAB
C TRACH DNA SPEC QL NAA+PROBE: NOT DETECTED
N GONORRHOEA DNA SPEC QL NAA+PROBE: NOT DETECTED

## 2024-11-04 ENCOUNTER — PATIENT MESSAGE (OUTPATIENT)
Dept: RESEARCH | Facility: HOSPITAL | Age: 29
End: 2024-11-04
Payer: COMMERCIAL

## 2024-11-18 ENCOUNTER — PATIENT MESSAGE (OUTPATIENT)
Dept: ADMINISTRATIVE | Facility: OTHER | Age: 29
End: 2024-11-18
Payer: COMMERCIAL

## 2024-11-18 DIAGNOSIS — E66.9 OBESITY, UNSPECIFIED CLASS, UNSPECIFIED OBESITY TYPE, UNSPECIFIED WHETHER SERIOUS COMORBIDITY PRESENT: ICD-10-CM

## 2024-11-18 RX ORDER — SEMAGLUTIDE 1.7 MG/.75ML
1.7 INJECTION, SOLUTION SUBCUTANEOUS
Qty: 3 ML | Refills: 0 | Status: CANCELLED | OUTPATIENT
Start: 2024-11-18

## 2024-12-19 ENCOUNTER — TELEPHONE (OUTPATIENT)
Dept: INTERNAL MEDICINE | Facility: CLINIC | Age: 29
End: 2024-12-19
Payer: COMMERCIAL

## 2024-12-19 DIAGNOSIS — E66.9 OBESITY, UNSPECIFIED CLASS, UNSPECIFIED OBESITY TYPE, UNSPECIFIED WHETHER SERIOUS COMORBIDITY PRESENT: Primary | ICD-10-CM

## 2024-12-19 RX ORDER — SEMAGLUTIDE 2.4 MG/.75ML
2.4 INJECTION, SOLUTION SUBCUTANEOUS
Qty: 3 ML | Refills: 5 | Status: ACTIVE | OUTPATIENT
Start: 2024-12-19

## 2024-12-19 NOTE — TELEPHONE ENCOUNTER
Patient ID: Rupal Mata is a 29 y.o. Black or  male    Subjective  Chief Complaint: patient presents for medical weight loss management.      Co-morbidities: none    History of weight loss therapy:  Patient [facility] July [facility]24 and is currently managed on Wegovy 2.4 mg.      Tolerance to current therapy:  Denies nausea, vomiting, diarrhea, constipation, abdominal pain      Weight loss history:  Starting weight:    6/3/2024   Recent Readings    Weight (lbs) 385 lb    BMI 52.21 BMI        Current weight:    10/30/2024   Recent Readings    Weight (lbs) 352 lb    BMI 47.73 BMI        % weight loss since GLP-1 initiation: 8.5 %    Objective  Lab Results   Component Value Date     09/29/2019     05/28/2019     01/06/2018     Lab Results   Component Value Date    K 4.1 09/29/2019    K 3.7 05/28/2019    K 3.7 01/06/2018     Lab Results   Component Value Date     09/29/2019     05/28/2019     01/06/2018     Lab Results   Component Value Date    CO2 26 09/29/2019    CO2 25 05/28/2019    CO2 28 01/06/2018     Lab Results   Component Value Date    BUN 14 09/29/2019    BUN 11 05/28/2019    BUN 15 01/06/2018     Lab Results   Component Value Date    GLU 88 09/29/2019    GLU 83 05/28/2019     01/06/2018     Lab Results   Component Value Date    CALCIUM 9.6 09/29/2019    CALCIUM 9.9 05/28/2019    CALCIUM 9.6 01/06/2018     Lab Results   Component Value Date    PROT 7.2 09/29/2019    PROT 7.5 05/28/2019    PROT 7.2 01/06/2018     Lab Results   Component Value Date    ALBUMIN 3.9 09/29/2019    ALBUMIN 4.2 05/28/2019    ALBUMIN 4.2 01/06/2018     Lab Results   Component Value Date    BILITOT 0.3 09/29/2019    BILITOT 0.8 05/28/2019    BILITOT 0.4 01/06/2018     Lab Results   Component Value Date    AST 29 09/29/2019    AST 94 (H) 05/28/2019    AST 25 01/06/2018     Lab Results   Component Value Date    ALT 31 09/29/2019     (H)  "05/28/2019    ALT 32 01/06/2018     Lab Results   Component Value Date    ANIONGAP 9 09/29/2019    ANIONGAP 8 05/28/2019    ANIONGAP 11 01/06/2018     Lab Results   Component Value Date    CREATININE 1.0 09/29/2019    CREATININE 0.9 05/28/2019    CREATININE 0.96 01/06/2018     No results found for: "EGFRNORACEVR"  Assessment/Plan  -Continuation of GLP-1 RA therapy  -Continue Wegovy 2.4 mg once weekly  -RTC in 6 months     Patient consented to pharmacist management via collaborative practice.  "

## 2025-03-26 ENCOUNTER — PATIENT MESSAGE (OUTPATIENT)
Dept: ADMINISTRATIVE | Facility: OTHER | Age: 30
End: 2025-03-26
Payer: COMMERCIAL

## 2025-05-23 ENCOUNTER — PATIENT MESSAGE (OUTPATIENT)
Dept: ADMINISTRATIVE | Facility: OTHER | Age: 30
End: 2025-05-23
Payer: COMMERCIAL

## 2025-05-30 ENCOUNTER — PATIENT MESSAGE (OUTPATIENT)
Dept: INTERNAL MEDICINE | Facility: CLINIC | Age: 30
End: 2025-05-30

## 2025-05-30 ENCOUNTER — OFFICE VISIT (OUTPATIENT)
Dept: INTERNAL MEDICINE | Facility: CLINIC | Age: 30
End: 2025-05-30
Payer: COMMERCIAL

## 2025-05-30 DIAGNOSIS — E66.01 OBESITY, CLASS III, BMI 40-49.9 (MORBID OBESITY): Primary | ICD-10-CM

## 2025-06-30 NOTE — PROGRESS NOTES
Patient ID: Rupal Mata is a 29 y.o. Black or  male    Subjective  Chief Complaint: patient presents for medical weight loss management.      Co-morbidities: none    History of weight loss therapy:  Patient [facility] July 2024 and is currently managed on Wegovy 2.4 mg.      Tolerance to current therapy:  Denies nausea, vomiting, diarrhea, constipation, abdominal pain    Weight loss history:  Starting weight:    6/3/2024   Recent Readings    Weight (lbs) 385 lb    BMI 52.21 BMI      Current weight: 319 lbs - pt reported (BMI 43.3)      % weight loss since GLP-1 initiation: 17%    Objective  Lab Results   Component Value Date     09/29/2019     05/28/2019     01/06/2018     Lab Results   Component Value Date    K 4.1 09/29/2019    K 3.7 05/28/2019    K 3.7 01/06/2018     Lab Results   Component Value Date     09/29/2019     05/28/2019     01/06/2018     Lab Results   Component Value Date    CO2 26 09/29/2019    CO2 25 05/28/2019    CO2 28 01/06/2018     Lab Results   Component Value Date    BUN 14 09/29/2019    BUN 11 05/28/2019    BUN 15 01/06/2018     Lab Results   Component Value Date    GLU 88 09/29/2019    GLU 83 05/28/2019     01/06/2018     Lab Results   Component Value Date    CALCIUM 9.6 09/29/2019    CALCIUM 9.9 05/28/2019    CALCIUM 9.6 01/06/2018     Lab Results   Component Value Date    PROT 7.2 09/29/2019    PROT 7.5 05/28/2019    PROT 7.2 01/06/2018     Lab Results   Component Value Date    ALBUMIN 3.9 09/29/2019    ALBUMIN 4.2 05/28/2019    ALBUMIN 4.2 01/06/2018     Lab Results   Component Value Date    BILITOT 0.3 09/29/2019    BILITOT 0.8 05/28/2019    BILITOT 0.4 01/06/2018     Lab Results   Component Value Date    AST 29 09/29/2019    AST 94 (H) 05/28/2019    AST 25 01/06/2018     Lab Results   Component Value Date    ALT 31 09/29/2019     (H) 05/28/2019    ALT 32 01/06/2018     Lab Results  "  Component Value Date    ANIONGAP 9 09/29/2019    ANIONGAP 8 05/28/2019    ANIONGAP 11 01/06/2018     Lab Results   Component Value Date    CREATININE 1.0 09/29/2019    CREATININE 0.9 05/28/2019    CREATININE 0.96 01/06/2018     No results found for: "EGFRNORACEVR"  Assessment/Plan  -Continue Wegovy 2.4 mg once weekly  -RTC in 6 months     Patient consented to pharmacist management via collaborative practice.                "

## 2025-07-02 ENCOUNTER — PATIENT MESSAGE (OUTPATIENT)
Dept: INTERNAL MEDICINE | Facility: CLINIC | Age: 30
End: 2025-07-02

## 2025-07-02 ENCOUNTER — OFFICE VISIT (OUTPATIENT)
Dept: INTERNAL MEDICINE | Facility: CLINIC | Age: 30
End: 2025-07-02
Payer: COMMERCIAL

## 2025-07-02 DIAGNOSIS — E66.01 OBESITY, CLASS III, BMI 40-49.9 (MORBID OBESITY): Primary | ICD-10-CM

## 2025-07-02 RX ORDER — SEMAGLUTIDE 2.4 MG/.75ML
2.4 INJECTION, SOLUTION SUBCUTANEOUS
Qty: 3 ML | Refills: 5 | Status: ACTIVE | OUTPATIENT
Start: 2025-07-02

## 2025-07-21 ENCOUNTER — PATIENT MESSAGE (OUTPATIENT)
Dept: ADMINISTRATIVE | Facility: OTHER | Age: 30
End: 2025-07-21
Payer: COMMERCIAL

## 2025-08-15 ENCOUNTER — PATIENT MESSAGE (OUTPATIENT)
Dept: ADMINISTRATIVE | Facility: OTHER | Age: 30
End: 2025-08-15
Payer: COMMERCIAL